# Patient Record
Sex: FEMALE | Race: WHITE | Employment: OTHER | URBAN - METROPOLITAN AREA
[De-identification: names, ages, dates, MRNs, and addresses within clinical notes are randomized per-mention and may not be internally consistent; named-entity substitution may affect disease eponyms.]

---

## 2017-01-10 RX ORDER — ESTRADIOL 0.1 MG/G
CREAM VAGINAL
Qty: 42.5 G | Refills: 1 | Status: SHIPPED | OUTPATIENT
Start: 2017-01-10 | End: 2018-07-01

## 2017-01-26 ENCOUNTER — HOSPITAL ENCOUNTER (OUTPATIENT)
Dept: SURGERY | Age: 62
Discharge: HOME | End: 2017-01-26
Admitting: INTERNAL MEDICINE

## 2017-01-26 VITALS
SYSTOLIC BLOOD PRESSURE: 107 MMHG | TEMPERATURE: 98.9 F | RESPIRATION RATE: 14 BRPM | DIASTOLIC BLOOD PRESSURE: 73 MMHG | WEIGHT: 137 LBS | OXYGEN SATURATION: 100 % | BODY MASS INDEX: 21.5 KG/M2 | HEART RATE: 65 BPM | HEIGHT: 67 IN

## 2017-01-26 RX ORDER — LIDOCAINE HYDROCHLORIDE 10 MG/ML
0.1 INJECTION, SOLUTION EPIDURAL; INFILTRATION; INTRACAUDAL; PERINEURAL ONCE
Status: DISCONTINUED | OUTPATIENT
Start: 2017-01-26 | End: 2017-01-27 | Stop reason: HOSPADM

## 2017-01-26 RX ORDER — SODIUM CHLORIDE, SODIUM LACTATE, POTASSIUM CHLORIDE, CALCIUM CHLORIDE 600; 310; 30; 20 MG/100ML; MG/100ML; MG/100ML; MG/100ML
INJECTION, SOLUTION INTRAVENOUS CONTINUOUS
Status: DISCONTINUED | OUTPATIENT
Start: 2017-01-26 | End: 2017-01-27 | Stop reason: HOSPADM

## 2017-01-26 ASSESSMENT — PAIN SCALES - GENERAL
PAINLEVEL_OUTOF10: 0

## 2017-01-26 ASSESSMENT — PAIN DESCRIPTION - LOCATION: LOCATION: ABDOMEN

## 2017-02-22 DIAGNOSIS — Z12.31 ENCOUNTER FOR SCREENING MAMMOGRAM FOR BREAST CANCER: Primary | ICD-10-CM

## 2017-02-22 DIAGNOSIS — M81.0 OSTEOPOROSIS: ICD-10-CM

## 2017-02-22 RX ORDER — PSEUDOEPHEDRINE HCL 60 MG/1
60 TABLET ORAL 2 TIMES DAILY
Qty: 180 TABLET | Refills: 3 | Status: ON HOLD | OUTPATIENT
Start: 2017-02-22 | End: 2018-07-03 | Stop reason: HOSPADM

## 2017-02-27 ENCOUNTER — HOSPITAL ENCOUNTER (OUTPATIENT)
Dept: OTHER | Age: 62
Discharge: HOME | End: 2017-02-27
Attending: FAMILY MEDICINE | Admitting: FAMILY MEDICINE

## 2017-02-27 DIAGNOSIS — R04.2 HEMOPTYSIS: Primary | ICD-10-CM

## 2017-02-27 DIAGNOSIS — R04.2 HEMOPTYSIS: ICD-10-CM

## 2017-03-08 ENCOUNTER — OFFICE VISIT (OUTPATIENT)
Dept: ENT CLINIC | Age: 62
End: 2017-03-08

## 2017-03-08 VITALS
HEIGHT: 67 IN | DIASTOLIC BLOOD PRESSURE: 70 MMHG | BODY MASS INDEX: 22.44 KG/M2 | SYSTOLIC BLOOD PRESSURE: 114 MMHG | WEIGHT: 143 LBS

## 2017-03-08 DIAGNOSIS — R04.2 HEMOPTYSIS: Primary | ICD-10-CM

## 2017-03-08 PROCEDURE — 31575 DIAGNOSTIC LARYNGOSCOPY: CPT | Performed by: OTOLARYNGOLOGY

## 2017-03-10 ENCOUNTER — TELEPHONE (OUTPATIENT)
Dept: FAMILY MEDICINE CLINIC | Age: 62
End: 2017-03-10

## 2017-03-10 ENCOUNTER — NURSE ONLY (OUTPATIENT)
Dept: FAMILY MEDICINE CLINIC | Age: 62
End: 2017-03-10

## 2017-03-10 DIAGNOSIS — M81.0 OSTEOPOROSIS: ICD-10-CM

## 2017-03-10 DIAGNOSIS — Z13.220 LIPID SCREENING: ICD-10-CM

## 2017-03-10 DIAGNOSIS — Z11.4 SCREENING FOR HIV (HUMAN IMMUNODEFICIENCY VIRUS): Primary | ICD-10-CM

## 2017-03-10 DIAGNOSIS — R04.2 HEMOPTYSIS: ICD-10-CM

## 2017-03-10 DIAGNOSIS — Z79.1 LONG TERM (CURRENT) USE OF NON-STEROIDAL ANTI-INFLAMMATORIES (NSAID): ICD-10-CM

## 2017-03-10 DIAGNOSIS — Z11.59 NEED FOR HEPATITIS C SCREENING TEST: ICD-10-CM

## 2017-03-10 LAB
ALBUMIN SERPL-MCNC: 4.6 G/DL (ref 3.4–5)
ANION GAP SERPL CALCULATED.3IONS-SCNC: 14 MMOL/L (ref 3–16)
BASOPHILS ABSOLUTE: 0 K/UL (ref 0–0.2)
BASOPHILS RELATIVE PERCENT: 0.8 %
BUN BLDV-MCNC: 16 MG/DL (ref 7–20)
CALCIUM SERPL-MCNC: 10.1 MG/DL (ref 8.3–10.6)
CHLORIDE BLD-SCNC: 99 MMOL/L (ref 99–110)
CHOLESTEROL, TOTAL: 224 MG/DL (ref 0–199)
CO2: 26 MMOL/L (ref 21–32)
CREAT SERPL-MCNC: 0.6 MG/DL (ref 0.6–1.2)
EOSINOPHILS ABSOLUTE: 0.1 K/UL (ref 0–0.6)
EOSINOPHILS RELATIVE PERCENT: 2.6 %
GFR AFRICAN AMERICAN: >60
GFR NON-AFRICAN AMERICAN: >60
GLUCOSE BLD-MCNC: 73 MG/DL (ref 70–99)
HCT VFR BLD CALC: 39.5 % (ref 36–48)
HDLC SERPL-MCNC: 75 MG/DL (ref 40–60)
HEMOGLOBIN: 12.8 G/DL (ref 12–16)
HEPATITIS C ANTIBODY INTERPRETATION: NORMAL
LDL CHOLESTEROL CALCULATED: 136 MG/DL
LYMPHOCYTES ABSOLUTE: 1.1 K/UL (ref 1–5.1)
LYMPHOCYTES RELATIVE PERCENT: 32.1 %
MCH RBC QN AUTO: 28.7 PG (ref 26–34)
MCHC RBC AUTO-ENTMCNC: 32.5 G/DL (ref 31–36)
MCV RBC AUTO: 88.3 FL (ref 80–100)
MONOCYTES ABSOLUTE: 0.3 K/UL (ref 0–1.3)
MONOCYTES RELATIVE PERCENT: 9.2 %
NEUTROPHILS ABSOLUTE: 1.9 K/UL (ref 1.7–7.7)
NEUTROPHILS RELATIVE PERCENT: 55.3 %
PDW BLD-RTO: 13.5 % (ref 12.4–15.4)
PHOSPHORUS: 4.1 MG/DL (ref 2.5–4.9)
PLATELET # BLD: 225 K/UL (ref 135–450)
PMV BLD AUTO: 9.8 FL (ref 5–10.5)
POTASSIUM SERPL-SCNC: 4.4 MMOL/L (ref 3.5–5.1)
RBC # BLD: 4.47 M/UL (ref 4–5.2)
SODIUM BLD-SCNC: 139 MMOL/L (ref 136–145)
TRIGL SERPL-MCNC: 65 MG/DL (ref 0–150)
VLDLC SERPL CALC-MCNC: 13 MG/DL
WBC # BLD: 3.5 K/UL (ref 4–11)

## 2017-03-10 PROCEDURE — 36415 COLL VENOUS BLD VENIPUNCTURE: CPT | Performed by: FAMILY MEDICINE

## 2017-03-11 LAB — VITAMIN D 25-HYDROXY: 63.4 NG/ML

## 2017-03-13 LAB — HIV-1 AND HIV-2 ANTIBODIES: NORMAL

## 2017-03-29 ENCOUNTER — TELEPHONE (OUTPATIENT)
Dept: FAMILY MEDICINE CLINIC | Age: 62
End: 2017-03-29

## 2017-03-31 ENCOUNTER — NURSE ONLY (OUTPATIENT)
Dept: FAMILY MEDICINE CLINIC | Age: 62
End: 2017-03-31

## 2017-03-31 VITALS
SYSTOLIC BLOOD PRESSURE: 118 MMHG | HEIGHT: 67 IN | BODY MASS INDEX: 22.54 KG/M2 | WEIGHT: 143.6 LBS | DIASTOLIC BLOOD PRESSURE: 66 MMHG

## 2017-03-31 DIAGNOSIS — Z00.00 WELL ADULT EXAM: Primary | ICD-10-CM

## 2017-04-27 ENCOUNTER — OFFICE VISIT (OUTPATIENT)
Dept: DERMATOLOGY | Age: 62
End: 2017-04-27

## 2017-04-27 DIAGNOSIS — Z12.83 SCREENING EXAM FOR SKIN CANCER: ICD-10-CM

## 2017-04-27 DIAGNOSIS — D22.9 MULTIPLE BENIGN NEVI: Primary | ICD-10-CM

## 2017-04-27 PROCEDURE — 99213 OFFICE O/P EST LOW 20 MIN: CPT | Performed by: DERMATOLOGY

## 2017-05-02 RX ORDER — AZITHROMYCIN 250 MG/1
TABLET, FILM COATED ORAL
Qty: 1 PACKET | Refills: 0 | Status: SHIPPED | OUTPATIENT
Start: 2017-05-02 | End: 2018-05-10 | Stop reason: ALTCHOICE

## 2017-05-04 ENCOUNTER — HOSPITAL ENCOUNTER (OUTPATIENT)
Dept: MAMMOGRAPHY | Age: 62
Discharge: HOME | End: 2017-05-04
Attending: FAMILY MEDICINE | Admitting: FAMILY MEDICINE

## 2017-05-04 DIAGNOSIS — M81.0 OSTEOPOROSIS: ICD-10-CM

## 2017-05-04 DIAGNOSIS — M81.0 AGE-RELATED OSTEOPOROSIS WITHOUT CURRENT PATHOLOGICAL FRACTURE: ICD-10-CM

## 2017-05-04 DIAGNOSIS — Z12.31 ENCOUNTER FOR SCREENING MAMMOGRAM FOR BREAST CANCER: ICD-10-CM

## 2017-06-06 ENCOUNTER — OFFICE VISIT (OUTPATIENT)
Dept: OBGYN CLINIC | Age: 62
End: 2017-06-06

## 2017-06-06 VITALS
TEMPERATURE: 98 F | HEIGHT: 66 IN | HEART RATE: 80 BPM | SYSTOLIC BLOOD PRESSURE: 104 MMHG | BODY MASS INDEX: 23.14 KG/M2 | DIASTOLIC BLOOD PRESSURE: 62 MMHG | WEIGHT: 144 LBS

## 2017-06-06 DIAGNOSIS — M85.80 OSTEOPENIA: ICD-10-CM

## 2017-06-06 DIAGNOSIS — Z53.8 PAP SMEAR OF CERVIX NOT NEEDED: ICD-10-CM

## 2017-06-06 DIAGNOSIS — Z90.710 HISTORY OF TOTAL VAGINAL HYSTERECTOMY (TVH): ICD-10-CM

## 2017-06-06 DIAGNOSIS — Z78.0 MENOPAUSE: ICD-10-CM

## 2017-06-06 DIAGNOSIS — Z01.419 WOMEN'S ANNUAL ROUTINE GYNECOLOGICAL EXAMINATION: Primary | ICD-10-CM

## 2017-06-06 PROCEDURE — 99396 PREV VISIT EST AGE 40-64: CPT | Performed by: OBSTETRICS & GYNECOLOGY

## 2017-06-06 ASSESSMENT — PATIENT HEALTH QUESTIONNAIRE - PHQ9
SUM OF ALL RESPONSES TO PHQ9 QUESTIONS 1 & 2: 0
2. FEELING DOWN, DEPRESSED OR HOPELESS: 0
SUM OF ALL RESPONSES TO PHQ QUESTIONS 1-9: 0
1. LITTLE INTEREST OR PLEASURE IN DOING THINGS: 0

## 2017-06-11 PROBLEM — Z78.0 MENOPAUSE: Status: ACTIVE | Noted: 2017-06-11

## 2017-06-11 PROBLEM — Z53.8 PAP SMEAR OF CERVIX NOT NEEDED: Status: ACTIVE | Noted: 2017-06-11

## 2017-06-11 ASSESSMENT — ENCOUNTER SYMPTOMS
CONSTIPATION: 0
ABDOMINAL DISTENTION: 0
ABDOMINAL PAIN: 0
NAUSEA: 0
DIARRHEA: 0
VOMITING: 0
SHORTNESS OF BREATH: 0

## 2017-08-04 ENCOUNTER — TELEPHONE (OUTPATIENT)
Dept: FAMILY MEDICINE CLINIC | Age: 62
End: 2017-08-04

## 2017-08-04 DIAGNOSIS — M54.2 NECK PAIN: Primary | ICD-10-CM

## 2017-11-28 RX ORDER — CELECOXIB 200 MG/1
CAPSULE ORAL
Qty: 180 CAPSULE | Refills: 3 | Status: ON HOLD | OUTPATIENT
Start: 2017-11-28 | End: 2018-07-03 | Stop reason: HOSPADM

## 2018-01-25 DIAGNOSIS — B96.89 ACUTE BACTERIAL SINUSITIS: Primary | ICD-10-CM

## 2018-01-25 DIAGNOSIS — J01.90 ACUTE BACTERIAL SINUSITIS: Primary | ICD-10-CM

## 2018-01-25 RX ORDER — AMOXICILLIN 875 MG/1
875 TABLET, COATED ORAL 2 TIMES DAILY
Qty: 20 TABLET | Refills: 0 | Status: SHIPPED | OUTPATIENT
Start: 2018-01-25 | End: 2018-02-04

## 2018-01-25 RX ORDER — AMOXICILLIN 875 MG/1
875 TABLET, COATED ORAL 2 TIMES DAILY
Qty: 20 TABLET | Refills: 0 | Status: SHIPPED | OUTPATIENT
Start: 2018-01-25 | End: 2018-01-25 | Stop reason: SDUPTHER

## 2018-05-01 DIAGNOSIS — M85.80 OSTEOPENIA, UNSPECIFIED LOCATION: Primary | ICD-10-CM

## 2018-05-01 DIAGNOSIS — Z12.39 BREAST CANCER SCREENING: ICD-10-CM

## 2018-05-10 ENCOUNTER — OFFICE VISIT (OUTPATIENT)
Dept: DERMATOLOGY | Age: 63
End: 2018-05-10

## 2018-05-10 DIAGNOSIS — Z12.83 SCREENING EXAM FOR SKIN CANCER: ICD-10-CM

## 2018-05-10 DIAGNOSIS — D22.9 MULTIPLE BENIGN NEVI: Primary | ICD-10-CM

## 2018-05-10 PROCEDURE — 99213 OFFICE O/P EST LOW 20 MIN: CPT | Performed by: DERMATOLOGY

## 2018-05-18 ENCOUNTER — NURSE ONLY (OUTPATIENT)
Dept: FAMILY MEDICINE CLINIC | Age: 63
End: 2018-05-18

## 2018-05-18 VITALS
DIASTOLIC BLOOD PRESSURE: 72 MMHG | HEIGHT: 67 IN | WEIGHT: 145.2 LBS | OXYGEN SATURATION: 97 % | SYSTOLIC BLOOD PRESSURE: 126 MMHG | HEART RATE: 56 BPM | BODY MASS INDEX: 22.79 KG/M2

## 2018-05-18 DIAGNOSIS — Z00.00 VISIT FOR ANNUAL HEALTH EXAMINATION: Primary | ICD-10-CM

## 2018-05-18 LAB
ANION GAP SERPL CALCULATED.3IONS-SCNC: 12 MMOL/L (ref 3–16)
BUN BLDV-MCNC: 15 MG/DL (ref 7–20)
CALCIUM SERPL-MCNC: 9.7 MG/DL (ref 8.3–10.6)
CHLORIDE BLD-SCNC: 101 MMOL/L (ref 99–110)
CHOLESTEROL, TOTAL: 232 MG/DL (ref 0–199)
CO2: 29 MMOL/L (ref 21–32)
CREAT SERPL-MCNC: 0.5 MG/DL (ref 0.6–1.2)
GFR AFRICAN AMERICAN: >60
GFR NON-AFRICAN AMERICAN: >60
GLUCOSE BLD-MCNC: 79 MG/DL (ref 70–99)
HDLC SERPL-MCNC: 86 MG/DL (ref 40–60)
LDL CHOLESTEROL CALCULATED: 133 MG/DL
POTASSIUM SERPL-SCNC: 4.7 MMOL/L (ref 3.5–5.1)
SODIUM BLD-SCNC: 142 MMOL/L (ref 136–145)
TRIGL SERPL-MCNC: 63 MG/DL (ref 0–150)
VITAMIN D 25-HYDROXY: 69.1 NG/ML
VLDLC SERPL CALC-MCNC: 13 MG/DL

## 2018-05-18 PROCEDURE — 36415 COLL VENOUS BLD VENIPUNCTURE: CPT | Performed by: FAMILY MEDICINE

## 2018-05-31 ENCOUNTER — HOSPITAL ENCOUNTER (OUTPATIENT)
Dept: MAMMOGRAPHY | Age: 63
Discharge: HOME | End: 2018-05-31
Attending: FAMILY MEDICINE | Admitting: FAMILY MEDICINE

## 2018-05-31 DIAGNOSIS — Z12.39 BREAST CANCER SCREENING: ICD-10-CM

## 2018-06-27 ENCOUNTER — NURSE ONLY (OUTPATIENT)
Dept: FAMILY MEDICINE CLINIC | Age: 63
End: 2018-06-27

## 2018-06-27 DIAGNOSIS — R00.0 TACHYCARDIA: Primary | ICD-10-CM

## 2018-06-27 DIAGNOSIS — R06.02 SOB (SHORTNESS OF BREATH) ON EXERTION: Primary | ICD-10-CM

## 2018-06-27 DIAGNOSIS — R06.09 DOE (DYSPNEA ON EXERTION): ICD-10-CM

## 2018-06-27 PROCEDURE — 36415 COLL VENOUS BLD VENIPUNCTURE: CPT | Performed by: FAMILY MEDICINE

## 2018-06-27 PROCEDURE — 93000 ELECTROCARDIOGRAM COMPLETE: CPT | Performed by: FAMILY MEDICINE

## 2018-06-28 ENCOUNTER — HOSPITAL ENCOUNTER (OUTPATIENT)
Dept: OTHER | Age: 63
Discharge: HOME | End: 2018-06-28
Attending: FAMILY MEDICINE | Admitting: FAMILY MEDICINE

## 2018-06-28 DIAGNOSIS — R06.09 DOE (DYSPNEA ON EXERTION): ICD-10-CM

## 2018-06-28 DIAGNOSIS — R00.0 TACHYCARDIA: Primary | ICD-10-CM

## 2018-06-28 DIAGNOSIS — R00.0 TACHYCARDIA: ICD-10-CM

## 2018-06-28 LAB
ANION GAP SERPL CALCULATED.3IONS-SCNC: 15 MMOL/L (ref 3–16)
BASOPHILS ABSOLUTE: 0 K/UL (ref 0–0.2)
BASOPHILS RELATIVE PERCENT: 0.6 %
BUN BLDV-MCNC: 14 MG/DL (ref 7–20)
CALCIUM SERPL-MCNC: 10 MG/DL (ref 8.3–10.6)
CHLORIDE BLD-SCNC: 100 MMOL/L (ref 99–110)
CO2: 27 MMOL/L (ref 21–32)
CREAT SERPL-MCNC: 0.7 MG/DL (ref 0.6–1.2)
EOSINOPHILS ABSOLUTE: 0.1 K/UL (ref 0–0.6)
EOSINOPHILS RELATIVE PERCENT: 0.9 %
GFR AFRICAN AMERICAN: >60
GFR NON-AFRICAN AMERICAN: >60
GLUCOSE BLD-MCNC: 56 MG/DL (ref 70–99)
HCT VFR BLD CALC: 40.8 % (ref 36–48)
HEMOGLOBIN: 13.7 G/DL (ref 12–16)
LYMPHOCYTES ABSOLUTE: 1.1 K/UL (ref 1–5.1)
LYMPHOCYTES RELATIVE PERCENT: 19.6 %
MAGNESIUM: 2.1 MG/DL (ref 1.8–2.4)
MCH RBC QN AUTO: 29.1 PG (ref 26–34)
MCHC RBC AUTO-ENTMCNC: 33.7 G/DL (ref 31–36)
MCV RBC AUTO: 86.5 FL (ref 80–100)
MONOCYTES ABSOLUTE: 0.5 K/UL (ref 0–1.3)
MONOCYTES RELATIVE PERCENT: 8.9 %
NEUTROPHILS ABSOLUTE: 4 K/UL (ref 1.7–7.7)
NEUTROPHILS RELATIVE PERCENT: 70 %
PDW BLD-RTO: 13 % (ref 12.4–15.4)
PLATELET # BLD: 194 K/UL (ref 135–450)
PMV BLD AUTO: 10 FL (ref 5–10.5)
POTASSIUM SERPL-SCNC: 4.7 MMOL/L (ref 3.5–5.1)
RBC # BLD: 4.72 M/UL (ref 4–5.2)
SODIUM BLD-SCNC: 142 MMOL/L (ref 136–145)
T4 FREE: 1.2 NG/DL (ref 0.9–1.8)
TSH SERPL DL<=0.05 MIU/L-ACNC: 2.71 UIU/ML (ref 0.27–4.2)
WBC # BLD: 5.7 K/UL (ref 4–11)

## 2018-07-01 PROBLEM — R06.02 SOB (SHORTNESS OF BREATH): Status: ACTIVE | Noted: 2018-07-01

## 2018-07-01 PROBLEM — R79.89 ELEVATED TROPONIN: Status: ACTIVE | Noted: 2018-07-01

## 2018-07-01 PROBLEM — R07.9 CHEST PAIN: Status: ACTIVE | Noted: 2018-07-01

## 2018-07-01 PROBLEM — Z53.8 PAP SMEAR OF CERVIX NOT NEEDED: Status: RESOLVED | Noted: 2017-06-11 | Resolved: 2018-07-01

## 2018-07-01 PROBLEM — I26.99 BILATERAL PULMONARY EMBOLISM (HCC): Status: ACTIVE | Noted: 2018-07-01

## 2018-07-01 PROBLEM — R77.8 ELEVATED TROPONIN: Status: ACTIVE | Noted: 2018-07-01

## 2018-07-09 ENCOUNTER — OFFICE VISIT (OUTPATIENT)
Dept: FAMILY MEDICINE CLINIC | Age: 63
End: 2018-07-09

## 2018-07-09 VITALS
DIASTOLIC BLOOD PRESSURE: 82 MMHG | OXYGEN SATURATION: 92 % | WEIGHT: 144 LBS | HEART RATE: 88 BPM | SYSTOLIC BLOOD PRESSURE: 129 MMHG | HEIGHT: 66 IN | BODY MASS INDEX: 23.14 KG/M2

## 2018-07-09 DIAGNOSIS — I26.99 BILATERAL PULMONARY EMBOLISM (HCC): Primary | ICD-10-CM

## 2018-07-09 DIAGNOSIS — I51.7 VENTRICULAR ENLARGEMENT, RIGHT: ICD-10-CM

## 2018-07-09 DIAGNOSIS — I82.493 ACUTE DEEP VEIN THROMBOSIS (DVT) OF OTHER SPECIFIED VEIN OF BOTH LOWER EXTREMITIES (HCC): ICD-10-CM

## 2018-07-09 DIAGNOSIS — R06.02 SOB (SHORTNESS OF BREATH) ON EXERTION: ICD-10-CM

## 2018-07-09 PROBLEM — I82.409 DEEP VENOUS THROMBOSIS (HCC): Status: ACTIVE | Noted: 2018-07-09

## 2018-07-09 PROCEDURE — 99214 OFFICE O/P EST MOD 30 MIN: CPT | Performed by: FAMILY MEDICINE

## 2018-07-09 PROCEDURE — 1111F DSCHRG MED/CURRENT MED MERGE: CPT | Performed by: FAMILY MEDICINE

## 2018-07-09 NOTE — PROGRESS NOTES
status: Never Smoker    Smokeless tobacco: Never Used    Alcohol use Yes      Comment: rarely    Drug use: No    Sexual activity: Yes     Partners: Male     Other Topics Concern    Not on file     Social History Narrative    No narrative on file       Prior to Visit Medications    Medication Sig Taking? Authorizing Provider   apixaban (ELIQUIS) 5 MG TABS tablet Take 1 tablet twice a day Yes Sharon Weiss MD   Biotin 5000 MCG CAPS Take 5,000 mcg by mouth Yes Historical Provider, MD   Cholecalciferol (VITAMIN D) 2000 UNITS CAPS capsule Take 2,000 Units by mouth Yes Historical Provider, MD   Calcium Carbonate-Vitamin D (CALCIUM-VITAMIN D) 500-200 MG-UNIT per tablet Take 1 tablet by mouth 2 times daily (with meals). Yes Historical Provider, MD   Glucosamine-Chondroit-Vit C-Mn (GLUCOSAMINE 1500 COMPLEX) CAPS Take 2 tablets by mouth daily. Yes Historical Provider, MD   therapeutic multivitamin-minerals (THERAGRAN-M) tablet Take 1 tablet by mouth daily. Yes Historical Provider, MD     Allergies   Allergen Reactions    Erythromycin Nausea Only       OBJECTIVE:  Estimated body mass index is 23.24 kg/m² as calculated from the following:    Height as of this encounter: 5' 6\" (1.676 m). Weight as of this encounter: 144 lb (65.3 kg). Vitals:    07/09/18 1809   BP: 129/82   Site: Left Arm   Position: Sitting   Cuff Size: Medium Adult   Pulse: 88   SpO2: 92%   Weight: 144 lb (65.3 kg)   Height: 5' 6\" (1.676 m)     BP Readings from Last 2 Encounters:   07/09/18 129/82   07/03/18 119/73     Wt Readings from Last 3 Encounters:   07/09/18 144 lb (65.3 kg)   07/01/18 140 lb 8 oz (63.7 kg)   05/18/18 145 lb 3.2 oz (65.9 kg)       Physical Exam   Constitutional: She appears well-developed and well-nourished. No distress. HENT:   Head: Normocephalic and atraumatic.    Right Ear: External ear normal.   Left Ear: External ear normal.   Nose: Nose normal.   Lips symmetric   Eyes: Lids are normal. Pupils are equal,

## 2018-07-11 ENCOUNTER — TELEPHONE (OUTPATIENT)
Dept: FAMILY MEDICINE CLINIC | Age: 63
End: 2018-07-11

## 2018-07-31 PROBLEM — R77.8 ELEVATED TROPONIN: Status: RESOLVED | Noted: 2018-07-01 | Resolved: 2018-07-31

## 2018-07-31 PROBLEM — R79.89 ELEVATED TROPONIN: Status: RESOLVED | Noted: 2018-07-01 | Resolved: 2018-07-31

## 2018-08-14 ENCOUNTER — NURSE ONLY (OUTPATIENT)
Dept: FAMILY MEDICINE CLINIC | Age: 63
End: 2018-08-14

## 2018-08-14 DIAGNOSIS — Z23 NEED FOR SHINGLES VACCINE: Primary | ICD-10-CM

## 2018-08-14 PROCEDURE — 90471 IMMUNIZATION ADMIN: CPT | Performed by: FAMILY MEDICINE

## 2018-08-14 PROCEDURE — 90750 HZV VACC RECOMBINANT IM: CPT | Performed by: FAMILY MEDICINE

## 2018-11-28 NOTE — PROGRESS NOTES
twice a day 180 tablet 3    Biotin 5000 MCG CAPS Take 5,000 mcg by mouth      Cholecalciferol (VITAMIN D) 2000 UNITS CAPS capsule Take 2,000 Units by mouth      Calcium Carbonate-Vitamin D (CALCIUM-VITAMIN D) 500-200 MG-UNIT per tablet Take 1 tablet by mouth 2 times daily (with meals).  Glucosamine-Chondroit-Vit C-Mn (GLUCOSAMINE 1500 COMPLEX) CAPS Take 2 tablets by mouth daily.  therapeutic multivitamin-minerals (THERAGRAN-M) tablet Take 1 tablet by mouth daily. No current facility-administered medications for this visit. Allergies:  Erythromycin     Review of Systems:   A 14 point review of symptoms completed. Pertinent positives identified in the HPI, all other review of symptoms negative as below.     Objective:   PHYSICAL EXAM:    Vitals:    11/29/18 1428   BP: 110/70   Pulse: 65   SpO2: 98%    Weight: 147 lb 9.6 oz (67 kg)     Wt Readings from Last 3 Encounters:   11/29/18 147 lb 9.6 oz (67 kg)   07/09/18 144 lb (65.3 kg)   07/01/18 140 lb 8 oz (63.7 kg)         General Appearance:  Alert, cooperative, no distress, appears stated age   Head:  Normocephalic, atraumatic   Eyes:  PERRL, conjunctiva/corneas clear   Nose: Nares normal, no drainage or sinus tenderness   Throat: Lips, mucosa, and tongue normal   Neck: Supple, symmetrical, trachea midline, NL thyroid no carotid bruit or JVD   Lungs:   CTAB, respirations unlabored   Chest Wall:  No tenderness or deformity   Heart:  Regular rhythm and normal rate; S1, S2 are normal;   no murmur noted; no rub or gallop   Abdomen:   Soft, non-tender, +BS x 4, no masses, no organomegaly   Extremities: Extremities normal, atraumatic, no cyanosis or edema   Pulses: 2+ and symmetric   Skin: Skin color, texture, turgor normal, no rashes or lesions   Pysch: Normal mood and affect   Neurologic: Normal gross motor and sensory exam.         LABS   CBC:      Lab Results   Component Value Date    WBC 4.0 07/03/2018    RBC 4.64 07/03/2018    HGB 13.4 07/03/2018    HCT 40.1 07/03/2018    MCV 86.5 07/03/2018    RDW 12.8 07/03/2018     07/03/2018     CMP:  Lab Results   Component Value Date     07/03/2018    K 4.6 07/03/2018     07/03/2018    CO2 30 07/03/2018    BUN 11 07/03/2018    CREATININE <0.5 07/03/2018    GFRAA >60 07/03/2018    GFRAA >60 10/04/2012    AGRATIO 1.3 07/01/2018    LABGLOM >60 07/03/2018    GLUCOSE 91 07/03/2018    PROT 7.7 07/01/2018    PROT 7.3 10/04/2012    CALCIUM 9.5 07/03/2018    BILITOT 0.3 07/01/2018    ALKPHOS 75 07/01/2018    AST 20 07/01/2018    ALT 14 07/01/2018     PT/INR:   No results found for: PTINR  Liver:  No components found for: CHLPL  Lab Results   Component Value Date    ALT 14 07/01/2018    AST 20 07/01/2018    ALKPHOS 75 07/01/2018    BILITOT 0.3 07/01/2018     Lab Results   Component Value Date    LABA1C 5.2 07/02/2018     Lipids:         Lab Results   Component Value Date    TRIG 93 07/02/2018    TRIG 63 05/18/2018    TRIG 65 03/10/2017            Lab Results   Component Value Date    HDL 62 (H) 07/02/2018    HDL 86 (H) 05/18/2018    HDL 75 (H) 03/10/2017            Lab Results   Component Value Date    LDLCALC 111 (H) 07/02/2018    LDLCALC 133 (H) 05/18/2018    LDLCALC 136 (H) 03/10/2017            Lab Results   Component Value Date    LABVLDL 19 07/02/2018    LABVLDL 13 05/18/2018    LABVLDL 13 03/10/2017         CARDIAC DATA     ECHO 11/29   Normal left ventricle systolic function with an estimated ejection fraction of 55%.   No regional wall motion abnormalities are seen.   Normal left ventricular diastolic filling pressure.   Mild pulmonic regurgitation.   Mild mitral and tricuspid regurgitation.   Systolic pulmonary artery pressure (SPAP) is normal and estimated at 27 mmHg   (right atrial pressure 3 mmHg).   Compared to last echo on 7/2/2018, the right atrium and ventricle appear   smaller in size and SPAP has decreased with no change in EF .ECHO 11/29      ECHO/MUGA: 7/2/18  Summary   Left ventricular systolic function is normal with ejection fraction   estimated at 55%. Grade I diastolic dysfunction with normal left ventricular   filling pressure. Normal left ventricular size and wall thickness. -- Mild to moderate mitral regurgitation. No stenosis. -- The right ventricle is mildly enlarged by visual assessment. Right   ventricular systolic function is normal. There is mild diastolic septal   flattening consistent with right ventricular volume overload. There is mild   diastolic septal flattening consistent with right ventricular volume overload. -- Systolic pulmonary artery pressure (SPAP) is estimated at 47 mmHg   consistent with mild pulmonary hypertension (Right atrial pressure of 3   mmHg). STRESS TEST:    CARDIAC CATH:    VASCULAR/OTHER IMAGIN/2/18 Venous Doppler  Summary    1. There is evidence of acute deep venous thrombosis involving the soleal  and peroneal vein at mid calf in the right lower extremity. There is no other evidence of deep or superficial venous thrombosis involving the right  lower extremity   2. There is no evidence of deep or superficial venous thrombosis involving  the left lower extremity. Assessment and Plan   Juliocesar Sandoval is a 61 y.o. female who presents today for the following problems:      1. Pulmonary embolism: Bilateral, multiple segments  2. SOB- resolved  3. RV dilation- resolved     MD PLAN  1. Pt doing very well. Note below the knee DVT  2. Echo with full RV recovery   Can f/u prn        MD Plan:     Patient Active Problem List   Diagnosis    Osteopenia    History of total vaginal hysterectomy (TVH)    Menopause    Chest pain    SOB (shortness of breath) on exertion    Multiple bilateral pulmonary emboli with R heart strain    Deep venous thrombosis (HCC)    Ventricular enlargement, right       Patient Plan:  1. Continue current medications  2.  Follow up with PCP and Dr. Rob Bray      It is a pleasure to assist in the care of

## 2018-11-29 ENCOUNTER — OFFICE VISIT (OUTPATIENT)
Dept: CARDIOLOGY CLINIC | Age: 63
End: 2018-11-29
Payer: COMMERCIAL

## 2018-11-29 ENCOUNTER — HOSPITAL ENCOUNTER (OUTPATIENT)
Dept: CARDIOLOGY | Age: 63
Discharge: HOME | End: 2018-11-29
Payer: COMMERCIAL

## 2018-11-29 VITALS
BODY MASS INDEX: 23.72 KG/M2 | DIASTOLIC BLOOD PRESSURE: 70 MMHG | HEART RATE: 65 BPM | SYSTOLIC BLOOD PRESSURE: 110 MMHG | HEIGHT: 66 IN | OXYGEN SATURATION: 98 % | WEIGHT: 147.6 LBS

## 2018-11-29 DIAGNOSIS — I82.493 ACUTE DEEP VEIN THROMBOSIS (DVT) OF OTHER SPECIFIED VEIN OF BOTH LOWER EXTREMITIES (HCC): ICD-10-CM

## 2018-11-29 DIAGNOSIS — R06.02 SOB (SHORTNESS OF BREATH) ON EXERTION: ICD-10-CM

## 2018-11-29 DIAGNOSIS — I27.82 OTHER CHRONIC PULMONARY EMBOLISM WITHOUT ACUTE COR PULMONALE (HCC): Primary | ICD-10-CM

## 2018-11-29 LAB
LV EF: 55 %
LVEF MODALITY: NORMAL

## 2018-11-29 PROCEDURE — 99213 OFFICE O/P EST LOW 20 MIN: CPT | Performed by: INTERNAL MEDICINE

## 2018-11-29 PROCEDURE — 93306 TTE W/DOPPLER COMPLETE: CPT

## 2018-11-29 NOTE — LETTER
[address] Cardiology - [address] [place] [address]  Phone: [phone]  Fax: [phone]    Meka Perla MD        2018     Celia Tolentino, [address] [facility]. [place] [id number]    Patient: Zach Mendoza  MR Number: [id number]  YOB: 1955  Date of Visit: 2018    Dear Dr. Celia Tolentino:     Below are the relevant portions of my assessment and plan of care. [address]   Cardiovascular Evaluation    PATIENT: Lloyd Ruff: 2018  MRN: [id number]  CSN: [id number]  : 1955      Primary Care Doctor: Celia Tolentino MD  Reason for evaluation:   6 Month Follow-Up (echo results no cardiac complaints)      Subjective:   History of present illness on initial date of evaluation:   Zach Mendoza is a 61 y.o. patient who presents today for hospital follow up. Patient presented to the hospital with complaints of SOB. She reported that not able to do her 2 mile walk and HR noted about 160. Noted having palpitations, saw PCP and ordered echo and holter. No orthopnea. Few times felt like bra was too tight. No tobacco hx. + previous second hand exposure. Today she reports feeling well. Patient denies chest pain, sob, palpitations, dizziness or syncope. Patient Active Problem List   Diagnosis    Osteopenia    History of total vaginal hysterectomy (TVH)    Menopause    Chest pain    SOB (shortness of breath) on exertion    Multiple bilateral pulmonary emboli with R heart strain    Deep venous thrombosis (HCC)    Ventricular enlargement, right         Past Medical History:   has a past medical history of Allergic rhinitis; Osteoarthritis; and Osteoporosis. Surgical History:   has a past surgical history that includes Hysterectomy; Bunionectomy; Tubal ligation; and Colonoscopy (2017).      Social History: reports that she has never smoked. She has never used smokeless tobacco. She reports that she drinks alcohol. She reports that she does not use drugs. Family History:  No evidence for sudden cardiac death or premature CAD    Home Medications:  Reviewed and are listed in nursing record. and/or listed below  Current Outpatient Prescriptions   Medication Sig Dispense Refill    DiphenhydrAMINE HCl (BENADRYL ALLERGY PO) Take 25 mg by mouth nightly      apixaban (ELIQUIS) 5 MG TABS tablet Take 1 tablet twice a day 180 tablet 3    Biotin 5000 MCG CAPS Take 5,000 mcg by mouth      Cholecalciferol (VITAMIN D) 2000 UNITS CAPS capsule Take 2,000 Units by mouth      Calcium Carbonate-Vitamin D (CALCIUM-VITAMIN D) 500-200 MG-UNIT per tablet Take 1 tablet by mouth 2 times daily (with meals).  Glucosamine-Chondroit-Vit C-Mn (GLUCOSAMINE 1500 COMPLEX) CAPS Take 2 tablets by mouth daily.  therapeutic multivitamin-minerals (THERAGRAN-M) tablet Take 1 tablet by mouth daily. No current facility-administered medications for this visit. Allergies:  Erythromycin     Review of Systems:   A 14 point review of symptoms completed. Pertinent positives identified in the HPI, all other review of symptoms negative as below.     Objective:   PHYSICAL EXAM:    Vitals:    11/29/18 1428   BP: 110/70   Pulse: 65   SpO2: 98%    Weight: 147 lb 9.6 oz (67 kg)     Wt Readings from Last 3 Encounters:   11/29/18 147 lb 9.6 oz (67 kg)   07/09/18 144 lb (65.3 kg)   07/01/18 140 lb 8 oz (63.7 kg)         General Appearance:  Alert, cooperative, no distress, appears stated age   Head:  Normocephalic, atraumatic   Eyes:  PERRL, conjunctiva/corneas clear   Nose: Nares normal, no drainage or sinus tenderness   Throat: Lips, mucosa, and tongue normal   Neck: Supple, symmetrical, trachea midline, NL thyroid no carotid bruit or JVD   Lungs:   CTAB, respirations unlabored   Chest Wall:  No tenderness or deformity    MD PLAN  1. Pt doing very well. Note below the knee DVT  2. Echo with full RV recovery   Can f/u prn        MD Plan:     Patient Active Problem List   Diagnosis    Osteopenia    History of total vaginal hysterectomy (TVH)    Menopause    Chest pain    SOB (shortness of breath) on exertion    Multiple bilateral pulmonary emboli with R heart strain    Deep venous thrombosis (HCC)    Ventricular enlargement, right       Patient Plan:  1. Continue current medications  2. Follow up with PCP and Dr. Ana Maria Rangel      It is a pleasure to assist in the care of Luis Shelton. Please call with any questions. The scribes docuementation has been prepared under my direction and personally reviewed by me in its entirety. I confirm that the note above accurately reflects all work, treatment, procedures, and medical decision making performed by me. This note was scribed in the presence of Dr. Kinza Talley MD by Ava Boyer RN. Júnior Hirsch MD, [address] [facility] Cardiologist  [facility] [address]  [phone] [facility]  [phone] [address] [place] [address]  11/29/2018  2:47 PM             If you have questions, please do not hesitate to call me. I look forward to following Stacie Rosado along with you.     Sincerely,        Malachi Wright MD

## 2018-11-30 ENCOUNTER — NURSE ONLY (OUTPATIENT)
Dept: FAMILY MEDICINE CLINIC | Age: 63
End: 2018-11-30
Payer: COMMERCIAL

## 2018-11-30 DIAGNOSIS — Z23 NEED FOR SHINGLES VACCINE: Primary | ICD-10-CM

## 2018-11-30 PROCEDURE — 90471 IMMUNIZATION ADMIN: CPT | Performed by: FAMILY MEDICINE

## 2018-11-30 PROCEDURE — 90750 HZV VACC RECOMBINANT IM: CPT | Performed by: FAMILY MEDICINE

## 2018-11-30 NOTE — COMMUNICATION BODY
[address] [facility]   Cardiovascular Evaluation    PATIENT: Maria T Ortega: 2018  MRN: [id number]  CSN: [id number]  : 1955      Primary Care Doctor: Rogers Cooper MD  Reason for evaluation:   6 Month Follow-Up (echo results no cardiac complaints)      Subjective:   History of present illness on initial date of evaluation:   Kasie Gonzalez is a 61 y.o. patient who presents today for hospital follow up. Patient presented to the hospital with complaints of SOB. She reported that not able to do her 2 mile walk and HR noted about 160. Noted having palpitations, saw PCP and ordered echo and holter. No orthopnea. Few times felt like bra was too tight. No tobacco hx. + previous second hand exposure. Today she reports feeling well. Patient denies chest pain, sob, palpitations, dizziness or syncope. Patient Active Problem List   Diagnosis    Osteopenia    History of total vaginal hysterectomy (TVH)    Menopause    Chest pain    SOB (shortness of breath) on exertion    Multiple bilateral pulmonary emboli with R heart strain    Deep venous thrombosis (HCC)    Ventricular enlargement, right         Past Medical History:   has a past medical history of Allergic rhinitis; Osteoarthritis; and Osteoporosis. Surgical History:   has a past surgical history that includes Hysterectomy; Bunionectomy; Tubal ligation; and Colonoscopy (2017). Social History:   reports that she has never smoked. She has never used smokeless tobacco. She reports that she drinks alcohol. She reports that she does not use drugs. Family History:  No evidence for sudden cardiac death or premature CAD    Home Medications:  Reviewed and are listed in nursing record.  and/or listed below  Current Outpatient Prescriptions   Medication Sig Dispense Refill    DiphenhydrAMINE HCl (BENADRYL ALLERGY PO) Take 25 mg by mouth nightly      apixaban (ELIQUIS) 5 MG TABS tablet Take 1 tablet

## 2018-12-26 ENCOUNTER — TELEPHONE (OUTPATIENT)
Dept: FAMILY MEDICINE CLINIC | Age: 63
End: 2018-12-26

## 2018-12-26 RX ORDER — FAMCICLOVIR 500 MG/1
TABLET, FILM COATED ORAL
Qty: 20 TABLET | Refills: 0 | Status: SHIPPED | OUTPATIENT
Start: 2018-12-26

## 2018-12-26 NOTE — LETTER
[facility]  [address] [facility]  Phone: [phone]  Fax: [phone]    Kenyatta Cobian MD        December 26, 2018    [address] [place] [address]      To Whom It May Concern,     In my opinion, Brennan Shelby will benefit for medical massage for bilateral shoulder pain. If you have any questions or concerns, please don't hesitate to call.     Sincerely,          Kenyatta Cobian MD

## 2019-01-02 ENCOUNTER — TELEPHONE (OUTPATIENT)
Dept: FAMILY MEDICINE CLINIC | Age: 64
End: 2019-01-02

## 2019-02-26 ENCOUNTER — NURSE ONLY (OUTPATIENT)
Dept: FAMILY MEDICINE CLINIC | Age: 64
End: 2019-02-26
Payer: COMMERCIAL

## 2019-02-26 DIAGNOSIS — Z23 NEED FOR TD VACCINE: Primary | ICD-10-CM

## 2019-02-26 PROCEDURE — 90471 IMMUNIZATION ADMIN: CPT | Performed by: FAMILY MEDICINE

## 2019-02-26 PROCEDURE — 90714 TD VACC NO PRESV 7 YRS+ IM: CPT | Performed by: FAMILY MEDICINE

## 2019-03-13 DIAGNOSIS — Z12.31 SCREENING MAMMOGRAM, ENCOUNTER FOR: Primary | ICD-10-CM

## 2019-03-13 DIAGNOSIS — M85.80 OSTEOPENIA, UNSPECIFIED LOCATION: ICD-10-CM

## 2019-03-15 ENCOUNTER — OFFICE VISIT (OUTPATIENT)
Dept: OBGYN CLINIC | Age: 64
End: 2019-03-15
Payer: COMMERCIAL

## 2019-03-15 VITALS
DIASTOLIC BLOOD PRESSURE: 72 MMHG | BODY MASS INDEX: 22 KG/M2 | TEMPERATURE: 99.1 F | HEART RATE: 59 BPM | SYSTOLIC BLOOD PRESSURE: 116 MMHG | WEIGHT: 140.2 LBS | HEIGHT: 67 IN

## 2019-03-15 DIAGNOSIS — Z01.419 WOMEN'S ANNUAL ROUTINE GYNECOLOGICAL EXAMINATION: Primary | ICD-10-CM

## 2019-03-15 DIAGNOSIS — Z78.0 MENOPAUSE: ICD-10-CM

## 2019-03-15 DIAGNOSIS — M85.80 OSTEOPENIA, UNSPECIFIED LOCATION: ICD-10-CM

## 2019-03-15 DIAGNOSIS — Z86.711 HISTORY OF PULMONARY EMBOLISM: ICD-10-CM

## 2019-03-15 DIAGNOSIS — Z90.710 HISTORY OF TOTAL VAGINAL HYSTERECTOMY (TVH): ICD-10-CM

## 2019-03-15 PROCEDURE — 99396 PREV VISIT EST AGE 40-64: CPT | Performed by: OBSTETRICS & GYNECOLOGY

## 2019-03-17 PROBLEM — Z86.711 HISTORY OF PULMONARY EMBOLISM: Status: ACTIVE | Noted: 2019-03-17

## 2019-03-17 ASSESSMENT — ENCOUNTER SYMPTOMS
SHORTNESS OF BREATH: 0
NAUSEA: 0
ABDOMINAL DISTENTION: 0
CONSTIPATION: 0
DIARRHEA: 0
ABDOMINAL PAIN: 0
VOMITING: 0

## 2019-03-20 RX ORDER — CELECOXIB 200 MG/1
CAPSULE ORAL
Qty: 90 CAPSULE | Refills: 3 | Status: SHIPPED | OUTPATIENT
Start: 2019-03-20 | End: 2020-01-06 | Stop reason: SDUPTHER

## 2019-06-13 ENCOUNTER — HOSPITAL ENCOUNTER (OUTPATIENT)
Dept: MAMMOGRAPHY | Age: 64
Discharge: HOME | End: 2019-06-13
Payer: COMMERCIAL

## 2019-06-13 ENCOUNTER — HOSPITAL ENCOUNTER (OUTPATIENT)
Dept: GENERAL RADIOLOGY | Age: 64
Discharge: HOME | End: 2019-06-13
Payer: COMMERCIAL

## 2019-06-13 DIAGNOSIS — M85.80 OSTEOPENIA, UNSPECIFIED LOCATION: ICD-10-CM

## 2019-06-13 DIAGNOSIS — Z12.31 SCREENING MAMMOGRAM, ENCOUNTER FOR: ICD-10-CM

## 2019-06-13 PROCEDURE — 77080 DXA BONE DENSITY AXIAL: CPT

## 2019-06-13 PROCEDURE — 77063 BREAST TOMOSYNTHESIS BI: CPT

## 2019-06-14 DIAGNOSIS — M85.80 OSTEOPENIA, UNSPECIFIED LOCATION: ICD-10-CM

## 2019-06-14 DIAGNOSIS — Z12.39 SCREENING FOR BREAST CANCER: Primary | ICD-10-CM

## 2019-06-28 ENCOUNTER — NURSE ONLY (OUTPATIENT)
Dept: FAMILY MEDICINE CLINIC | Age: 64
End: 2019-06-28
Payer: COMMERCIAL

## 2019-06-28 DIAGNOSIS — Z00.00 ROUTINE GENERAL MEDICAL EXAMINATION AT A HEALTH CARE FACILITY: Primary | ICD-10-CM

## 2019-06-28 LAB
ANION GAP SERPL CALCULATED.3IONS-SCNC: 12 MMOL/L (ref 3–16)
BUN BLDV-MCNC: 15 MG/DL (ref 7–20)
CALCIUM SERPL-MCNC: 10.1 MG/DL (ref 8.3–10.6)
CHLORIDE BLD-SCNC: 101 MMOL/L (ref 99–110)
CHOLESTEROL, TOTAL: 214 MG/DL (ref 0–199)
CO2: 27 MMOL/L (ref 21–32)
CREAT SERPL-MCNC: 0.6 MG/DL (ref 0.6–1.2)
GFR AFRICAN AMERICAN: >60
GFR NON-AFRICAN AMERICAN: >60
GLUCOSE BLD-MCNC: 80 MG/DL (ref 70–99)
HDLC SERPL-MCNC: 85 MG/DL (ref 40–60)
LDL CHOLESTEROL CALCULATED: 118 MG/DL
POTASSIUM SERPL-SCNC: 4.5 MMOL/L (ref 3.5–5.1)
SODIUM BLD-SCNC: 140 MMOL/L (ref 136–145)
TRIGL SERPL-MCNC: 53 MG/DL (ref 0–150)
VITAMIN D 25-HYDROXY: 57.9 NG/ML
VLDLC SERPL CALC-MCNC: 11 MG/DL

## 2019-06-28 PROCEDURE — 36415 COLL VENOUS BLD VENIPUNCTURE: CPT | Performed by: FAMILY MEDICINE

## 2019-10-17 ENCOUNTER — OFFICE VISIT (OUTPATIENT)
Dept: DERMATOLOGY | Age: 64
End: 2019-10-17
Payer: COMMERCIAL

## 2019-10-17 DIAGNOSIS — Z12.83 SCREENING EXAM FOR SKIN CANCER: ICD-10-CM

## 2019-10-17 DIAGNOSIS — D48.5 NEOPLASM OF UNCERTAIN BEHAVIOR OF SKIN: ICD-10-CM

## 2019-10-17 DIAGNOSIS — D22.9 MULTIPLE BENIGN NEVI: Primary | ICD-10-CM

## 2019-10-17 PROCEDURE — 11102 TANGNTL BX SKIN SINGLE LES: CPT | Performed by: DERMATOLOGY

## 2019-10-17 PROCEDURE — 11103 TANGNTL BX SKIN EA SEP/ADDL: CPT | Performed by: DERMATOLOGY

## 2019-10-17 PROCEDURE — 99213 OFFICE O/P EST LOW 20 MIN: CPT | Performed by: DERMATOLOGY

## 2019-10-24 LAB — DERMATOLOGY PATHOLOGY REPORT: NORMAL

## 2019-10-25 ENCOUNTER — TELEPHONE (OUTPATIENT)
Dept: DERMATOLOGY | Age: 64
End: 2019-10-25

## 2019-11-11 ENCOUNTER — NURSE ONLY (OUTPATIENT)
Dept: FAMILY MEDICINE CLINIC | Age: 64
End: 2019-11-11

## 2019-11-11 DIAGNOSIS — R06.02 SOB (SHORTNESS OF BREATH) ON EXERTION: Primary | ICD-10-CM

## 2019-12-12 ENCOUNTER — PROCEDURE VISIT (OUTPATIENT)
Dept: DERMATOLOGY | Age: 64
End: 2019-12-12
Payer: COMMERCIAL

## 2019-12-12 DIAGNOSIS — L57.0 ACTINIC KERATOSES: Primary | ICD-10-CM

## 2019-12-12 PROCEDURE — 17000 DESTRUCT PREMALG LESION: CPT | Performed by: DERMATOLOGY

## 2020-01-06 RX ORDER — CELECOXIB 200 MG/1
CAPSULE ORAL
Qty: 180 CAPSULE | Refills: 3 | Status: ON HOLD
Start: 2020-01-06 | End: 2020-06-22 | Stop reason: HOSPADM

## 2020-03-13 ENCOUNTER — HOSPITAL ENCOUNTER (OUTPATIENT)
Age: 65
Discharge: HOME | End: 2020-03-13
Payer: COMMERCIAL

## 2020-03-13 ENCOUNTER — HOSPITAL ENCOUNTER (OUTPATIENT)
Dept: GENERAL RADIOLOGY | Age: 65
Discharge: HOME | End: 2020-03-13
Payer: COMMERCIAL

## 2020-03-13 PROCEDURE — 73630 X-RAY EXAM OF FOOT: CPT

## 2020-06-21 ENCOUNTER — HOSPITAL ENCOUNTER (INPATIENT)
Age: 65
LOS: 1 days | Discharge: HOME | DRG: 176 | End: 2020-06-22
Attending: INTERNAL MEDICINE | Admitting: INTERNAL MEDICINE
Payer: COMMERCIAL

## 2020-06-21 ENCOUNTER — HOSPITAL ENCOUNTER (OUTPATIENT)
Dept: CT IMAGING | Age: 65
Discharge: HOME | End: 2020-06-21
Payer: COMMERCIAL

## 2020-06-21 ENCOUNTER — HOSPITAL ENCOUNTER (OUTPATIENT)
Age: 65
Discharge: HOME | End: 2020-06-21
Payer: COMMERCIAL

## 2020-06-21 PROBLEM — I26.99 PULMONARY EMBOLISM, BILATERAL (HCC): Status: ACTIVE | Noted: 2020-06-21

## 2020-06-21 LAB
APTT: 202 SEC (ref 24.2–36.2)
APTT: 34.6 SEC (ref 24.2–36.2)
BASOPHILS ABSOLUTE: 0 K/UL (ref 0–0.2)
BASOPHILS RELATIVE PERCENT: 0.5 %
D DIMER: 575 NG/ML DDU (ref 0–229)
EOSINOPHILS ABSOLUTE: 0.1 K/UL (ref 0–0.6)
EOSINOPHILS RELATIVE PERCENT: 1.1 %
HCT VFR BLD CALC: 41 % (ref 36–48)
HCT VFR BLD CALC: 42.4 % (ref 36–48)
HEMOGLOBIN: 13.6 G/DL (ref 12–16)
HEMOGLOBIN: 13.9 G/DL (ref 12–16)
LYMPHOCYTES ABSOLUTE: 1.4 K/UL (ref 1–5.1)
LYMPHOCYTES RELATIVE PERCENT: 25.7 %
MCH RBC QN AUTO: 28.8 PG (ref 26–34)
MCH RBC QN AUTO: 28.8 PG (ref 26–34)
MCHC RBC AUTO-ENTMCNC: 32.8 G/DL (ref 31–36)
MCHC RBC AUTO-ENTMCNC: 33 G/DL (ref 31–36)
MCV RBC AUTO: 87 FL (ref 80–100)
MCV RBC AUTO: 87.7 FL (ref 80–100)
MONOCYTES ABSOLUTE: 0.4 K/UL (ref 0–1.3)
MONOCYTES RELATIVE PERCENT: 7.6 %
NEUTROPHILS ABSOLUTE: 3.6 K/UL (ref 1.7–7.7)
NEUTROPHILS RELATIVE PERCENT: 65.1 %
PDW BLD-RTO: 13 % (ref 12.4–15.4)
PDW BLD-RTO: 13.1 % (ref 12.4–15.4)
PLATELET # BLD: 213 K/UL (ref 135–450)
PLATELET # BLD: 239 K/UL (ref 135–450)
PMV BLD AUTO: 8.8 FL (ref 5–10.5)
PMV BLD AUTO: 9.3 FL (ref 5–10.5)
RBC # BLD: 4.72 M/UL (ref 4–5.2)
RBC # BLD: 4.84 M/UL (ref 4–5.2)
WBC # BLD: 5.6 K/UL (ref 4–11)
WBC # BLD: 6.6 K/UL (ref 4–11)

## 2020-06-21 PROCEDURE — 71275 CT ANGIOGRAPHY CHEST: CPT

## 2020-06-21 PROCEDURE — 85730 THROMBOPLASTIN TIME PARTIAL: CPT

## 2020-06-21 PROCEDURE — 6370000000 HC RX 637 (ALT 250 FOR IP): Performed by: INTERNAL MEDICINE

## 2020-06-21 PROCEDURE — G0378 HOSPITAL OBSERVATION PER HR: HCPCS

## 2020-06-21 PROCEDURE — G0379 DIRECT REFER HOSPITAL OBSERV: HCPCS

## 2020-06-21 PROCEDURE — 85025 COMPLETE CBC W/AUTO DIFF WBC: CPT

## 2020-06-21 PROCEDURE — 96365 THER/PROPH/DIAG IV INF INIT: CPT

## 2020-06-21 PROCEDURE — 36415 COLL VENOUS BLD VENIPUNCTURE: CPT

## 2020-06-21 PROCEDURE — 96366 THER/PROPH/DIAG IV INF ADDON: CPT

## 2020-06-21 PROCEDURE — 1200000000 HC SEMI PRIVATE

## 2020-06-21 PROCEDURE — 6360000004 HC RX CONTRAST MEDICATION: Performed by: FAMILY MEDICINE

## 2020-06-21 PROCEDURE — 85379 FIBRIN DEGRADATION QUANT: CPT

## 2020-06-21 PROCEDURE — 85027 COMPLETE CBC AUTOMATED: CPT

## 2020-06-21 PROCEDURE — 93005 ELECTROCARDIOGRAM TRACING: CPT | Performed by: INTERNAL MEDICINE

## 2020-06-21 PROCEDURE — 6360000002 HC RX W HCPCS: Performed by: INTERNAL MEDICINE

## 2020-06-21 RX ORDER — HEPARIN SODIUM 1000 [USP'U]/ML
5000 INJECTION, SOLUTION INTRAVENOUS; SUBCUTANEOUS ONCE
Status: DISCONTINUED | OUTPATIENT
Start: 2020-06-21 | End: 2020-06-22

## 2020-06-21 RX ORDER — ACETAMINOPHEN 325 MG/1
650 TABLET ORAL EVERY 6 HOURS PRN
Status: DISCONTINUED | OUTPATIENT
Start: 2020-06-21 | End: 2020-06-22 | Stop reason: HOSPADM

## 2020-06-21 RX ORDER — SODIUM CHLORIDE 0.9 % (FLUSH) 0.9 %
10 SYRINGE (ML) INJECTION EVERY 12 HOURS SCHEDULED
Status: DISCONTINUED | OUTPATIENT
Start: 2020-06-21 | End: 2020-06-22 | Stop reason: HOSPADM

## 2020-06-21 RX ORDER — SODIUM CHLORIDE 0.9 % (FLUSH) 0.9 %
10 SYRINGE (ML) INJECTION PRN
Status: DISCONTINUED | OUTPATIENT
Start: 2020-06-21 | End: 2020-06-22 | Stop reason: HOSPADM

## 2020-06-21 RX ORDER — HEPARIN SODIUM 1000 [USP'U]/ML
2500 INJECTION, SOLUTION INTRAVENOUS; SUBCUTANEOUS PRN
Status: DISCONTINUED | OUTPATIENT
Start: 2020-06-21 | End: 2020-06-22

## 2020-06-21 RX ORDER — ONDANSETRON 2 MG/ML
4 INJECTION INTRAMUSCULAR; INTRAVENOUS EVERY 6 HOURS PRN
Status: DISCONTINUED | OUTPATIENT
Start: 2020-06-21 | End: 2020-06-22 | Stop reason: HOSPADM

## 2020-06-21 RX ORDER — HEPARIN SODIUM 1000 [USP'U]/ML
5000 INJECTION, SOLUTION INTRAVENOUS; SUBCUTANEOUS PRN
Status: DISCONTINUED | OUTPATIENT
Start: 2020-06-21 | End: 2020-06-22

## 2020-06-21 RX ORDER — PROMETHAZINE HYDROCHLORIDE 25 MG/1
12.5 TABLET ORAL EVERY 6 HOURS PRN
Status: DISCONTINUED | OUTPATIENT
Start: 2020-06-21 | End: 2020-06-22 | Stop reason: HOSPADM

## 2020-06-21 RX ORDER — POLYETHYLENE GLYCOL 3350 17 G/17G
17 POWDER, FOR SOLUTION ORAL DAILY PRN
Status: DISCONTINUED | OUTPATIENT
Start: 2020-06-21 | End: 2020-06-22 | Stop reason: HOSPADM

## 2020-06-21 RX ORDER — HEPARIN SODIUM 10000 [USP'U]/100ML
5.6 INJECTION, SOLUTION INTRAVENOUS CONTINUOUS
Status: DISCONTINUED | OUTPATIENT
Start: 2020-06-21 | End: 2020-06-22

## 2020-06-21 RX ORDER — DIPHENHYDRAMINE HCL 25 MG
25 TABLET ORAL NIGHTLY PRN
Status: DISCONTINUED | OUTPATIENT
Start: 2020-06-21 | End: 2020-06-22 | Stop reason: HOSPADM

## 2020-06-21 RX ORDER — ACETAMINOPHEN 650 MG/1
650 SUPPOSITORY RECTAL EVERY 6 HOURS PRN
Status: DISCONTINUED | OUTPATIENT
Start: 2020-06-21 | End: 2020-06-22 | Stop reason: HOSPADM

## 2020-06-21 RX ADMIN — DIPHENHYDRAMINE HCL 25 MG: 25 TABLET ORAL at 22:39

## 2020-06-21 RX ADMIN — HEPARIN SODIUM 5000 UNITS: 1000 INJECTION INTRAVENOUS; SUBCUTANEOUS at 15:36

## 2020-06-21 RX ADMIN — HEPARIN SODIUM AND DEXTROSE 11.2 ML/HR: 10000; 5 INJECTION INTRAVENOUS at 15:40

## 2020-06-21 RX ADMIN — IOPAMIDOL 75 ML: 755 INJECTION, SOLUTION INTRAVENOUS at 09:45

## 2020-06-21 ASSESSMENT — PAIN DESCRIPTION - PAIN TYPE: TYPE: ACUTE PAIN

## 2020-06-21 ASSESSMENT — PAIN DESCRIPTION - ORIENTATION: ORIENTATION: RIGHT

## 2020-06-21 ASSESSMENT — PAIN SCALES - GENERAL: PAINLEVEL_OUTOF10: 1

## 2020-06-21 ASSESSMENT — PAIN DESCRIPTION - LOCATION: LOCATION: LEG

## 2020-06-21 NOTE — H&P
Hospital Medicine History & Physical      PCP: Raghav Amanda MD    Date of Admission: 6/21/2020    Date of Service: Pt seen/examined on 06/21/20  and  Placed in Observation. Chief Complaint:   DA from PCP (Dr. Gaylene Saint) office with B/L PE     History Of Present Illness:   59 y.o. female with PMH of pulmonary embolism 2 years ago , seen by Dr. Sudhir Dougherty [facility] hematology ) and completed treatment with Eliquis directly admitted by PCP (Dr. Gaylene Saint)  to [facility] for shortness of breath on exertion, which on further work-up showed patient had bilateral acute lower lobe PE. Patient was also reported to have tachycardia with minimal exertion. Patient reports that her symptoms started 3 to 4 days ago but worsened yesterday evening. She reported that she usually does a lot of activity and yesterday while working in her garden \" pruning her shrubs\" noticed that she was getting short of breath with minimal exertion. She denies cough, fever, chills, nausea, vomiting, diarrhea . She reports she had exactly similar symptoms when she had PE 2 years ago and hence called her PCP who ordered for CTPA, which revealed bilateral pulmonary embolism. Patient denies smoking, drinking, illicit drug use. Denies any recent surgery nor use of contraceptive/hormone replacement therapy    Dr. Gaylene Saint requested direct admit admission for patient to be started on heparin GTT and to be evaluated by hematology.   Of note, patient had prior thrombophilia work-up (protein C, protein S, prothrombin gene mutation, Antithrombin panel, lupus anticoagulant, anticardiolipin antibodies, homocystine levels) on 1/17/2019 which was reported to be negative (per Dr. Gaylene Saint)     Upon evaluation by me, patient comfortable appears to be in no distress, denies chest pain, palpitations    Past Medical History:      Diagnosis Date    Allergic rhinitis     Osteoarthritis     Osteoporosis     Pulmonary emboli ([address] 07/2018       Past  Hypertension Brother        REVIEW OF SYSTEMS:   Pertinent positives as noted in the HPI. All other systems reviewed and negative. PHYSICAL EXAM PERFORMED:  /85   Pulse 80   Temp 98.3 °F (36.8 °C) (Oral)   Resp 16   SpO2 96%     General appearance:  No apparent distress, appears stated age and cooperative. HEENT:  Normal cephalic, atraumatic without obvious deformity. Pupils equal, round, and reactive to light. Extra ocular muscles intact. Conjunctivae/corneas clear. Neck: Supple, with full range of motion. No jugular venous distention. Trachea midline. Respiratory:  Normal respiratory effort. Clear to auscultation, bilaterally without Rales/Wheezes/Rhonchi. Cardiovascular:  Regular rate and rhythm with normal S1/S2 without murmurs, rubs or gallops. Abdomen: Soft, non-tender, non-distended with normal bowel sounds. Musculoskeletal:  No clubbing, cyanosis or edema bilaterally. Full range of motion without deformity. Skin: Skin color, texture, turgor normal.  No rashes or lesions. Neurologic:  Neurovascularly intact without any focal sensory/motor deficits. Cranial nerves: II-XII intact, grossly non-focal.  Psychiatric:  Alert and oriented, thought content appropriate, normal insight  Capillary Refill: Brisk,< 3 seconds   Peripheral Pulses: +2 palpable, equal bilaterally       Labs:   Recent Labs     06/21/20  0826   WBC 5.6   HGB 13.9   HCT 42.4        EKG:  I have reviewed the EKG with the following interpretation: Ordered and pending    Radiology:    I have reviewed the Imaging  with the following interpretation:  CTA chest dated 6/21/2020   1. Acute bilateral lower lobe pulmonary thromboemboli without evidence of   right ventricular heart strain.        Active Hospital Problems    Diagnosis Date Noted    Pulmonary embolism, bilateral ([address] [I26.99] 06/21/2020    Multiple bilateral pulmonary emboli with R heart strain [I26.99] 07/01/2018     ASSESSMENT/PLAN:  1. SOBOE secondary to

## 2020-06-22 VITALS
BODY MASS INDEX: 21.97 KG/M2 | DIASTOLIC BLOOD PRESSURE: 83 MMHG | HEIGHT: 67 IN | TEMPERATURE: 97.9 F | SYSTOLIC BLOOD PRESSURE: 123 MMHG | HEART RATE: 82 BPM | WEIGHT: 140 LBS | OXYGEN SATURATION: 96 % | RESPIRATION RATE: 16 BRPM

## 2020-06-22 LAB
ANION GAP SERPL CALCULATED.3IONS-SCNC: 6 MMOL/L (ref 3–16)
APTT: 89.8 SEC (ref 24.2–36.2)
BUN BLDV-MCNC: 13 MG/DL (ref 7–20)
CALCIUM SERPL-MCNC: 9.2 MG/DL (ref 8.3–10.6)
CHLORIDE BLD-SCNC: 104 MMOL/L (ref 99–110)
CO2: 29 MMOL/L (ref 21–32)
CREAT SERPL-MCNC: <0.5 MG/DL (ref 0.6–1.2)
EKG ATRIAL RATE: 63 BPM
EKG DIAGNOSIS: NORMAL
EKG P AXIS: 39 DEGREES
EKG P-R INTERVAL: 218 MS
EKG Q-T INTERVAL: 412 MS
EKG QRS DURATION: 90 MS
EKG QTC CALCULATION (BAZETT): 421 MS
EKG R AXIS: -30 DEGREES
EKG T AXIS: 19 DEGREES
EKG VENTRICULAR RATE: 63 BPM
GFR AFRICAN AMERICAN: >60
GFR NON-AFRICAN AMERICAN: >60
GLUCOSE BLD-MCNC: 90 MG/DL (ref 70–99)
HCT VFR BLD CALC: 39.3 % (ref 36–48)
HEMOGLOBIN: 13.1 G/DL (ref 12–16)
MCH RBC QN AUTO: 28.9 PG (ref 26–34)
MCHC RBC AUTO-ENTMCNC: 33.3 G/DL (ref 31–36)
MCV RBC AUTO: 86.6 FL (ref 80–100)
PDW BLD-RTO: 13 % (ref 12.4–15.4)
PLATELET # BLD: 190 K/UL (ref 135–450)
PMV BLD AUTO: 9.1 FL (ref 5–10.5)
POTASSIUM REFLEX MAGNESIUM: 4.3 MMOL/L (ref 3.5–5.1)
RBC # BLD: 4.54 M/UL (ref 4–5.2)
SODIUM BLD-SCNC: 139 MMOL/L (ref 136–145)
WBC # BLD: 4.3 K/UL (ref 4–11)

## 2020-06-22 PROCEDURE — G0378 HOSPITAL OBSERVATION PER HR: HCPCS

## 2020-06-22 PROCEDURE — 93010 ELECTROCARDIOGRAM REPORT: CPT | Performed by: INTERNAL MEDICINE

## 2020-06-22 PROCEDURE — 80048 BASIC METABOLIC PNL TOTAL CA: CPT

## 2020-06-22 PROCEDURE — 85027 COMPLETE CBC AUTOMATED: CPT

## 2020-06-22 PROCEDURE — 36415 COLL VENOUS BLD VENIPUNCTURE: CPT

## 2020-06-22 PROCEDURE — 6370000000 HC RX 637 (ALT 250 FOR IP): Performed by: INTERNAL MEDICINE

## 2020-06-22 PROCEDURE — 85730 THROMBOPLASTIN TIME PARTIAL: CPT

## 2020-06-22 RX ADMIN — APIXABAN 10 MG: 5 TABLET, FILM COATED ORAL at 12:17

## 2020-06-22 ASSESSMENT — PAIN SCALES - GENERAL
PAINLEVEL_OUTOF10: 0
PAINLEVEL_OUTOF10: 0

## 2020-06-22 NOTE — CONSULTS
Hematology/Oncology Consultation         Patient:   Eric Starks       Reason for Consult:  Recurrent PE   Requesting Physician: Derick Wu*    Chief Complaint:     SOB                History Obtained from:     patient, electronic medical record    History of Present Illness:     Eric Starks is a 59 y.o. female  with significant past medical history of PE/DVT who presents with shortness of breath and bilateral PE without R heart strain. She has seen Dr. Nessa Wadsworth in the past for thrombus. She was worked up in 2018 with hyper coag work up and negative results. NO family hx. She is not on systemic estrogen. Non smoker. No family hx of clotting. We are asked to see patient to guide outpatient management of blood thinners and determine length of therapy. She denies chest pain, SOB, or heart palpitations this AM. Creat is 0.5. She would like to go back to to work tomorrow. NO family present.     Past Medical History:         Diagnosis Date    Allergic rhinitis     Osteoarthritis     Osteoporosis     Pulmonary emboli ([address] 07/2018       Past Surgical History:         Procedure Laterality Date    BUNIONECTOMY      COLONOSCOPY  01/2017    normal    HYSTERECTOMY      TUBAL LIGATION         Current Medications:     Current Facility-Administered Medications   Medication Dose Route Frequency Provider Last Rate Last Dose    sodium chloride flush 0.9 % injection 10 mL  10 mL Intravenous 2 times per day Tish Qiu MD        sodium chloride flush 0.9 % injection 10 mL  10 mL Intravenous PRN Tish Qiu MD        acetaminophen (TYLENOL) tablet 650 mg  650 mg Oral Q6H PRN Tish Qiu MD        Or    acetaminophen (TYLENOL) suppository 650 mg  650 mg Rectal Q6H PRN Tish Qiu MD        polyethylene glycol (GLYCOLAX) packet 17 g  17 g Oral Daily PRN Tish Qiu MD        promethazine (PHENERGAN) tablet 12.5 mg  12.5 mg Oral Q6H PRN James Reddy Intimate partner violence     Fear of current or ex partner: Not on file     Emotionally abused: Not on file     Physically abused: Not on file     Forced sexual activity: Not on file   Other Topics Concern    Not on file   Social History Narrative    Not on file     Social History     Substance and Sexual Activity   Drug Use No     Social History     Substance and Sexual Activity   Alcohol Use Yes    Comment: rarely     Social History     Substance and Sexual Activity   Sexual Activity Not Currently    Partners: Male     Social History     Tobacco Use   Smoking Status Never Smoker   Smokeless Tobacco Never Used       Family History:     Family History   Problem Relation Age of Onset    Cancer Mother 79        breast, NMSC    High Blood Pressure Mother     Cancer Father 61        colon ca, prostate    High Blood Pressure Father     Cancer Sister 36        breast    Hypertension Brother     Prostate Cancer Brother     Atrial Fibrillation Brother     Hypertension Brother        Review of Systems:     Constitutional: Denies fever, sweats, weight loss. .     Eyes: No visual changes or diplopia. No scleral icterus. ENT: No Headaches, no hearing loss, no  vertigo. No mouth sores or sore throat. Cardiovascular: No chest pain, no dyspnea on exertion, no palpitations, no  loss of consciousness. Respiratory: see HPI   Gastrointestinal: No abdominal pain, no appetite loss, no blood in stools. No change in bowel habits. Genitourinary: No dysuria, trouble voiding, or hematuria. Musculoskeletal:  Generalized weakness. No joint complaints. Integumentary: No rash or pruritis. Neurological: No headache, diplopia. No change in gait, balance, or coordination. No paresthesias. Endocrine: No temperature intolerance. No excessive thirst, fluid intake, or urination. Hematologic/Lymphatic: No abnormal bruising or ecchymoses, no blood clots or swollen lymph nodes. Allergic/Immunologic: No nasal congestion or hives. Component Value Date    MG 2.20 07/03/2018       PT/INR:    No results found for: PTINR    No results found for: INR    PTT:    Lab Results   Component Value Date    APTT 89.8 06/22/2020       U/A:    Lab Results   Component Value Date    NITRITE negative 08/24/2015    COLORU yellow 08/24/2015    PHUR 6.0 08/24/2015    CLARITYU clear 08/24/2015    SPECGRAV 1.015 08/24/2015    LEUKOCYTESUR negative 08/24/2015    BILIRUBINUR negative 08/24/2015    BLOODU negative 08/24/2015    GLUCOSEU negative 08/24/2015       Imaging:     EXAMINATION:   CTA OF THE CHEST 6/21/2020 9:28 am       TECHNIQUE:   CTA of the chest was performed after the administration of intravenous   contrast.  Multiplanar reformatted images are provided for review.  MIP   images are provided for review. Dose modulation, iterative reconstruction,   and/or weight based adjustment of the mA/kV was utilized to reduce the   radiation dose to as low as reasonably achievable.       COMPARISON:   07/01/2018       HISTORY:   ORDERING SYSTEM PROVIDED HISTORY: Shortness of breath   TECHNOLOGIST PROVIDED HISTORY:   Reason for exam:->SOB, H/O PE no longer on anticoagulation       FINDINGS:   Pulmonary Arteries: Motion artifact degrades image quality.  However, there   are more multiple filling defects within the segmental bilateral lower lobe   pulmonary arteries consistent with acute pulmonary thromboemboli.  There is   no evidence of right ventricular heart strain.       Mediastinum: Coronary artery calcifications are a marker of atherosclerosis. There are no enlarged thoracic lymph nodes.       Lungs/pleura:  The airway is patent. Jerrica Hood is no pneumothorax or pleural   effusion. Jerrica Hood is biapical scarring.  No change in the 3 mm solid left   upper lobe pulmonary nodule, no follow-up imaging is recommended.       Upper Abdomen: Images of the upper abdomen are unremarkable.       Soft Tissues/Bones: Degenerative changes involve the thoracic spine and   bilateral

## 2020-06-22 NOTE — PLAN OF CARE
Problem: Pain:  Goal: Pain level will decrease  Description: Pain level will decrease  Outcome: Ongoing     Problem: Cardiovascular  Goal: Anticoagulate/Hct stable  Outcome: Ongoing

## 2020-06-23 ENCOUNTER — CARE COORDINATION (OUTPATIENT)
Dept: OTHER | Facility: CLINIC | Age: 65
End: 2020-06-23

## 2020-06-25 ENCOUNTER — HOSPITAL ENCOUNTER (OUTPATIENT)
Dept: MAMMOGRAPHY | Age: 65
Discharge: HOME | End: 2020-06-25
Payer: COMMERCIAL

## 2020-06-25 PROCEDURE — 77067 SCR MAMMO BI INCL CAD: CPT

## 2020-07-10 ENCOUNTER — OFFICE VISIT (OUTPATIENT)
Dept: ORTHOPEDIC SURGERY | Age: 65
End: 2020-07-10
Payer: COMMERCIAL

## 2020-07-10 VITALS — BODY MASS INDEX: 21.97 KG/M2 | WEIGHT: 140 LBS | HEIGHT: 67 IN

## 2020-07-10 PROCEDURE — 99243 OFF/OP CNSLTJ NEW/EST LOW 30: CPT | Performed by: PHYSICIAN ASSISTANT

## 2020-07-10 NOTE — PROGRESS NOTES
Chief Complaint    Knee Pain (rt knee pain and stiffness, has some episodes where she has bent leg backwards, swelling)      History of Present Illness:  Mary Ferguson is a 59 y.o. female presents to the office today for a new problem. Patient sent in orthopedic consultation per Dr. Corey Ortiz. Patient is here with chief complaint of right medial and anterior knee pain. Over the last 2 weeks she has had 2 separate injuries. One was hyperextension and the other was a twisting injury after stepping in a hole. She states that originally after the above-mentioned injuries she was unable to bend her knee. She was complaining of pain and swelling. Over next several days things did gradually improve but she still symptomatic. No past medical history contributing to the region. Increased pain with twisting activities and stairs.       Pain Assessment  Location of Pain: Knee  Location Modifiers: Right  Severity of Pain: 7  Frequency of Pain: Intermittent  Aggravating Factors: Standing, Walking, Bending  Limiting Behavior: Some  Result of Injury: Yes  Work-Related Injury: No  Are there other pain locations you wish to document?: No    Medical History:  Past Medical History:   Diagnosis Date    Allergic rhinitis     Osteoarthritis     Osteoporosis     Pulmonary emboli ([address] 07/2018     Patient Active Problem List    Diagnosis Date Noted    Pulmonary embolism, bilateral ([address] 06/21/2020    History of pulmonary embolism 03/17/2019    Deep venous thrombosis ([address] 07/09/2018    Ventricular enlargement, right 07/09/2018    Chest pain 07/01/2018    SOB (shortness of breath) on exertion 07/01/2018    Multiple bilateral pulmonary emboli with R heart strain 07/01/2018    Menopause 06/11/2017    Pap smear of cervix reminder not needed forever 06/11/2017    Osteopenia 05/19/2016    History of total vaginal hysterectomy (TVH) 05/19/2016     Past Surgical History:   Procedure Laterality Date    BUNIONECTOMY  COLONOSCOPY  01/2017    normal    HYSTERECTOMY      TUBAL LIGATION       Family History   Problem Relation Age of Onset    Cancer Mother 79        breast, NMSC    High Blood Pressure Mother     Cancer Father 61        colon ca, prostate    High Blood Pressure Father     Cancer Sister 36        breast    Hypertension Brother     Prostate Cancer Brother     Atrial Fibrillation Brother     Hypertension Brother      Social History     Socioeconomic History    Marital status:       Spouse name: None    Number of children: 3    Years of education: None    Highest education level: None   Occupational History    Occupation: nurse   Social Needs    Financial resource strain: None    Food insecurity     Worry: None     Inability: None    Transportation needs     Medical: None     Non-medical: None   Tobacco Use    Smoking status: Never Smoker    Smokeless tobacco: Never Used   Substance and Sexual Activity    Alcohol use: Yes     Comment: rarely    Drug use: No    Sexual activity: Not Currently     Partners: Male   Lifestyle    Physical activity     Days per week: None     Minutes per session: None    Stress: None   Relationships    Social connections     Talks on phone: None     Gets together: None     Attends Islam service: None     Active member of club or organization: None     Attends meetings of clubs or organizations: None     Relationship status: None    Intimate partner violence     Fear of current or ex partner: None     Emotionally abused: None     Physically abused: None     Forced sexual activity: None   Other Topics Concern    None   Social History Narrative    None     Current Outpatient Medications   Medication Sig Dispense Refill    apixaban (ELIQUIS) 5 MG TABS tablet Take 1 tablet by mouth 2 times daily 180 tablet 3    famciclovir (FAMVIR) 500 MG tablet Take 2 tablets twice a day per fever blister outbreak 20 tablet 0    DiphenhydrAMINE HCl (BENADRYL ALLERGY PO) Take 25 mg by mouth nightly      Cholecalciferol (VITAMIN D) 2000 UNITS CAPS capsule Take 2,000 Units by mouth      Calcium Carbonate-Vitamin D (CALCIUM-VITAMIN D) 500-200 MG-UNIT per tablet Take 1 tablet by mouth 2 times daily (with meals).  Glucosamine-Chondroit-Vit C-Mn (GLUCOSAMINE 1500 COMPLEX) CAPS Take 2 tablets by mouth daily.  therapeutic multivitamin-minerals (THERAGRAN-M) tablet Take 1 tablet by mouth daily. No current facility-administered medications for this visit. Review of Systems:  Relevant review of systems reviewed and available in the patient's chart    Vital Signs:  Ht 5' 6.5\" (1.689 m)   Wt 140 lb (63.5 kg)   BMI 22.26 kg/m²     General Exam:   Constitutional: Patient is adequately groomed with no evidence of malnutrition  DTRs: Deep tendon reflexes are intact  Mental Status: The patient is oriented to time, place and person. The patient's mood and affect are appropriate. Lymphatic: The lymphatic examination bilaterally reveals all areas to be without enlargement or induration. Vascular: Examination reveals no swelling or calf tenderness. Peripheral pulses are palpable and 2+. Neurological: The patient has good coordination. There is no weakness or sensory deficit. Body mass index is 22.26 kg/m². Right knee Examination:    Inspection:  No swelling, ecchymosis or deformity    Palpation:  Tenderness to palpation directly over the medial joint line    Range of Motion:  Well-preserved range of motion, 0-120°. Strength:  4+/5 quad and hamstring strength    Special Tests:  Positive Jorge's examination. Stable to varus and valgus stress testing. Negative Lachman's exam.  Positive patellofemoral grind test    Skin: There are no rashes, ulcerations or lesions.     Gait: Mild antalgic gait    Reflex normal deep tendon reflexes    Additional Comments:       Additional Examinations:         Contralateral Exam: Examination of the left knee reveals intact skin. There is no focal tenderness. The patient demonstrates full painless range of motion with regard to flexion and extension. Strength is 5/5 throughout all planes. Ligamentous stability is grossly intact. Examination of the right and hip reveals intact skin. The patient demonstrates full painless range of motion with regards to flexion, abduction, internal and external rotation. There is no tenderness about the greater trochanter. There is a negative straight leg raise against resistance. Strength is 5/5 throughout all planes. Radiology:     X-rays obtained and reviewed in office:  Views 4 views including AP weightbearing, PA flexed weightbearing, lateral, and skyline  Location right knee  Impression mild thinning of the lateral patella facet. Medial lateral joint spaces well-preserved. No evidence of fractures or dislocations. Impression:  Encounter Diagnosis   Name Primary?  Right knee pain, unspecified chronicity Yes       Office Procedures:  Orders Placed This Encounter   Procedures    XR KNEE RIGHT (MIN 4 VIEWS)     Standing Status:   Future     Number of Occurrences:   1     Standing Expiration Date:   7/10/2021       Treatment Plan: Patient is suffering from patellofemoral syndrome and possible medial meniscus tear. Patient is given a home exercise program for quadricep strengthening exercises. I have also ordered her an MRI to evaluate for medial meniscus tear. She can call for MRI results a few days after her MRI appointment.

## 2020-07-17 ENCOUNTER — HOSPITAL ENCOUNTER (OUTPATIENT)
Dept: MRI IMAGING | Age: 65
Discharge: HOME | End: 2020-07-17
Payer: COMMERCIAL

## 2020-07-17 PROCEDURE — 73721 MRI JNT OF LWR EXTRE W/O DYE: CPT

## 2020-07-20 ENCOUNTER — TELEPHONE (OUTPATIENT)
Dept: ORTHOPEDIC SURGERY | Age: 65
End: 2020-07-20

## 2020-07-20 NOTE — TELEPHONE ENCOUNTER
Spoke to patient. Informed her of MRI results. She will prefer to come in for visit and discuss it in more detail when she gets back from vacation. Jessi can you mail her home quad and hamstring strengthening exercises.

## 2020-08-27 ENCOUNTER — HOSPITAL ENCOUNTER (OUTPATIENT)
Dept: VASCULAR LAB | Age: 65
Discharge: HOME | End: 2020-08-27
Payer: COMMERCIAL

## 2020-08-27 PROCEDURE — 93971 EXTREMITY STUDY: CPT

## 2020-09-01 ENCOUNTER — OFFICE VISIT (OUTPATIENT)
Dept: ORTHOPEDIC SURGERY | Age: 65
End: 2020-09-01
Payer: MEDICARE

## 2020-09-01 VITALS — WEIGHT: 140 LBS | BODY MASS INDEX: 21.97 KG/M2 | HEIGHT: 67 IN

## 2020-09-01 PROCEDURE — G8420 CALC BMI NORM PARAMETERS: HCPCS | Performed by: PHYSICIAN ASSISTANT

## 2020-09-01 PROCEDURE — G8427 DOCREV CUR MEDS BY ELIG CLIN: HCPCS | Performed by: PHYSICIAN ASSISTANT

## 2020-09-01 PROCEDURE — 99213 OFFICE O/P EST LOW 20 MIN: CPT | Performed by: PHYSICIAN ASSISTANT

## 2020-09-01 PROCEDURE — 3017F COLORECTAL CA SCREEN DOC REV: CPT | Performed by: PHYSICIAN ASSISTANT

## 2020-09-01 PROCEDURE — 1036F TOBACCO NON-USER: CPT | Performed by: PHYSICIAN ASSISTANT

## 2020-09-01 RX ORDER — BUPIVACAINE HYDROCHLORIDE 5 MG/ML
30 INJECTION, SOLUTION PERINEURAL ONCE
Status: COMPLETED | OUTPATIENT
Start: 2020-09-01 | End: 2020-09-01

## 2020-09-01 RX ORDER — BETAMETHASONE SODIUM PHOSPHATE AND BETAMETHASONE ACETATE 3; 3 MG/ML; MG/ML
12 INJECTION, SUSPENSION INTRA-ARTICULAR; INTRALESIONAL; INTRAMUSCULAR; SOFT TISSUE ONCE
Status: COMPLETED | OUTPATIENT
Start: 2020-09-01 | End: 2020-09-01

## 2020-09-01 RX ADMIN — BETAMETHASONE SODIUM PHOSPHATE AND BETAMETHASONE ACETATE 12 MG: 3; 3 INJECTION, SUSPENSION INTRA-ARTICULAR; INTRALESIONAL; INTRAMUSCULAR; SOFT TISSUE at 14:56

## 2020-09-01 RX ADMIN — BUPIVACAINE HYDROCHLORIDE 150 MG: 5 INJECTION, SOLUTION PERINEURAL at 14:57

## 2020-09-01 NOTE — PROGRESS NOTES
Chief Complaint    Knee Pain (RIGHT knee pain persist; occasional catching pain with buckling episodes; discussed MRI results over phone)      History of Present Illness:  Puneet Rincon is a 59 y.o. female presents to the office today for a follow-up visit. She has had 2 separate injuries. One was hyperextension and the other was a twisting injury after stepping in a hole. She states that originally after the above-mentioned injuries she was unable to bend her knee. She was complaining of pain and swelling. Over next several days things did gradually improve but she still symptomatic. No past medical history contributing to the region. Increased pain with twisting activities and stairs.       Pain Assessment  Location of Pain: Knee  Location Modifiers: Right  Severity of Pain: 9  Quality of Pain: Sharp, Buckling  Duration of Pain: Persistent  Frequency of Pain: Intermittent  Aggravating Factors: Walking, Squatting, Standing  Limiting Behavior: Some  Relieving Factors: Rest    Medical History:  Past Medical History:   Diagnosis Date    Allergic rhinitis     Osteoarthritis     Osteoporosis     Pulmonary emboli ([address] 07/2018     Patient Active Problem List    Diagnosis Date Noted    Pulmonary embolism, bilateral ([address] 06/21/2020    History of pulmonary embolism 03/17/2019    Deep venous thrombosis ([address] 07/09/2018    Ventricular enlargement, right 07/09/2018    Chest pain 07/01/2018    SOB (shortness of breath) on exertion 07/01/2018    Multiple bilateral pulmonary emboli with R heart strain 07/01/2018    Menopause 06/11/2017    Pap smear of cervix reminder not needed forever 06/11/2017    Osteopenia 05/19/2016    History of total vaginal hysterectomy (TVH) 05/19/2016     Past Surgical History:   Procedure Laterality Date    BUNIONECTOMY      COLONOSCOPY  01/2017    normal    HYSTERECTOMY      TUBAL LIGATION       Family History   Problem Relation Age of Onset    Cancer Mother 79 breast, NMSC    High Blood Pressure Mother     Cancer Father 61        colon ca, prostate    High Blood Pressure Father     Cancer Sister 36        breast    Hypertension Brother     Prostate Cancer Brother     Atrial Fibrillation Brother     Hypertension Brother      Social History     Socioeconomic History    Marital status:       Spouse name: None    Number of children: 3    Years of education: None    Highest education level: None   Occupational History    Occupation: nurse   Social Needs    Financial resource strain: None    Food insecurity     Worry: None     Inability: None    Transportation needs     Medical: None     Non-medical: None   Tobacco Use    Smoking status: Never Smoker    Smokeless tobacco: Never Used   Substance and Sexual Activity    Alcohol use: Yes     Comment: rarely    Drug use: No    Sexual activity: Not Currently     Partners: Male   Lifestyle    Physical activity     Days per week: None     Minutes per session: None    Stress: None   Relationships    Social connections     Talks on phone: None     Gets together: None     Attends Orthodoxy service: None     Active member of club or organization: None     Attends meetings of clubs or organizations: None     Relationship status: None    Intimate partner violence     Fear of current or ex partner: None     Emotionally abused: None     Physically abused: None     Forced sexual activity: None   Other Topics Concern    None   Social History Narrative    None     Current Outpatient Medications   Medication Sig Dispense Refill    apixaban (ELIQUIS) 5 MG TABS tablet Take 1 tablet by mouth 2 times daily 180 tablet 3    famciclovir (FAMVIR) 500 MG tablet Take 2 tablets twice a day per fever blister outbreak 20 tablet 0    DiphenhydrAMINE HCl (BENADRYL ALLERGY PO) Take 25 mg by mouth nightly      Cholecalciferol (VITAMIN D) 2000 UNITS CAPS capsule Take 2,000 Units by mouth      Calcium Carbonate-Vitamin D throughout all planes. Ligamentous stability is grossly intact. Examination of the right and hip reveals intact skin. The patient demonstrates full painless range of motion with regards to flexion, abduction, internal and external rotation. There is no tenderness about the greater trochanter. There is a negative straight leg raise against resistance. Strength is 5/5 throughout all planes. Radiology:       EXAMINATION:    MRI OF THE RIGHT KNEE WITHOUT CONTRAST, 7/17/2020 9:48 am         TECHNIQUE:    Multiplanar multisequence MRI of the right knee was performed without the    administration of intravenous contrast.         COMPARISON:    None.         HISTORY:    ORDERING SYSTEM PROVIDED HISTORY: Right knee pain, unspecified chronicity    TECHNOLOGIST PROVIDED HISTORY:    Reason for Exam: knee pain wo injury    Acuity: Acute    Type of Exam: Initial         FINDINGS:    MENISCI: Lateral meniscus is intact.  Increased T2 signal noted within the    posterior horn of the medial meniscus with poor visualization of the root    concerning for tear.  Mild extrusion of the body.         CRUCIATE LIGAMENTS: The anterior and posterior cruciate ligaments are normal    in signal, morphology and course.         EXTENSOR MECHANISM: The quadriceps and patellar tendons are intact.  The    medial and lateral patellar retinacula are intact.         LATERAL COLLATERAL LIGAMENT COMPLEX: The popliteus tendon, biceps femoris    tendon, fibular collateral ligament and iliotibial band are intact.         MEDIAL COLLATERAL LIGAMENT COMPLEX: The superficial and deep components of    the medial collateral ligament are intact.         KNEE JOINT: Small joint effusion.  Grade 3-4 chondral loss along the patella    most prominent at the apex where there are subchondral cysts.  Similar    regions of chondral loss medial trochlea.  Focal high-grade chondral loss    posteromedial tibial plateau with subchondral cyst.         BONE MARROW: No acute fracture.  No suspicious marrow replacing lesion.              Impression    Degenerative signal noted within the posterior horn medial meniscus with    inner free margin fraying and poor visualization of the root concerning for    tear.  Mild extrusion of the body.         Small joint effusion.         Patellar and medial compartment chondromalacia. Impression:  Encounter Diagnosis   Name Primary?  Primary osteoarthritis of right knee Yes       Office Procedures:  Orders Placed This Encounter   Procedures    US ARTHR/ASP/INJ MAJOR JNT/BURSA RIGHT     Standing Status:   Future     Number of Occurrences:   1     Standing Expiration Date:   9/1/2021     Right knee cortisone injection    I discussed in detail the risks, benefits and complications of aninjection which included but are not limited to infection, skin reactions, hot swollen joint, and anaphylaxis with the patient. The patient verbalized understanding and gave informed consent for the right knee injection. The patient is placed supine on table with a bolster underneath knee. Knee prepped with Betadine/Sterile alcohol solution. A sterile 22-gauge needle was inserted into the knee and the mixture of 2ml Beta-Beta, 3 mL of 0.5% bupivacaine was injected under sterile technique. The needle was withdrawn and the puncture site sealed with a Band-Aid. Technique: Under sterile conditions a Son[employer] ultrasound unit with a variable frequency (6.0-15.0 MHz) linear transducer was used to localize the placement of a 22-gauge needle into the knee joint. Findings: Successful needle placement for knee injection. Final images were taken and saved for permanent record. The patient tolerated the injection well. The patient was instructed to call the office immediately if there is any pain, redness, warmth, fever, or chills. Treatment Plan: Patient has only a questionable small meniscal tear on MRI.   I believe her symptoms are more related to her patellofemoral chondromalacia and osteoarthritis medial compartment. We will perform a cortisone injection today. If she does not do well she will follow-up in the office. If she does well we have also discussed viscosupplementation injections for future treatment. She already takes Celebrex and is on Eliquis.

## 2020-09-14 ENCOUNTER — NURSE ONLY (OUTPATIENT)
Dept: FAMILY MEDICINE CLINIC | Age: 65
End: 2020-09-14
Payer: MEDICARE

## 2020-09-14 PROCEDURE — 90732 PPSV23 VACC 2 YRS+ SUBQ/IM: CPT | Performed by: FAMILY MEDICINE

## 2020-09-14 PROCEDURE — G0009 ADMIN PNEUMOCOCCAL VACCINE: HCPCS | Performed by: FAMILY MEDICINE

## 2020-09-28 NOTE — TELEPHONE ENCOUNTER
I have sent a refill request to you for my Eliquis,  this needs to go to my new mail away [address] fax number is [phone] and their phone is [phone], I have set up an account with them, it needs to be a 90 day supply please.   Thanks, Marcel Murray

## 2020-10-12 ENCOUNTER — OFFICE VISIT (OUTPATIENT)
Dept: ORTHOPEDIC SURGERY | Age: 65
End: 2020-10-12
Payer: MEDICARE

## 2020-10-12 VITALS — BODY MASS INDEX: 22.5 KG/M2 | HEIGHT: 66 IN | WEIGHT: 139.99 LBS

## 2020-10-12 PROCEDURE — 1090F PRES/ABSN URINE INCON ASSESS: CPT | Performed by: ORTHOPAEDIC SURGERY

## 2020-10-12 PROCEDURE — 3017F COLORECTAL CA SCREEN DOC REV: CPT | Performed by: ORTHOPAEDIC SURGERY

## 2020-10-12 PROCEDURE — G8427 DOCREV CUR MEDS BY ELIG CLIN: HCPCS | Performed by: ORTHOPAEDIC SURGERY

## 2020-10-12 PROCEDURE — G8420 CALC BMI NORM PARAMETERS: HCPCS | Performed by: ORTHOPAEDIC SURGERY

## 2020-10-12 PROCEDURE — 1036F TOBACCO NON-USER: CPT | Performed by: ORTHOPAEDIC SURGERY

## 2020-10-12 PROCEDURE — 4040F PNEUMOC VAC/ADMIN/RCVD: CPT | Performed by: ORTHOPAEDIC SURGERY

## 2020-10-12 PROCEDURE — G8399 PT W/DXA RESULTS DOCUMENT: HCPCS | Performed by: ORTHOPAEDIC SURGERY

## 2020-10-12 PROCEDURE — 1123F ACP DISCUSS/DSCN MKR DOCD: CPT | Performed by: ORTHOPAEDIC SURGERY

## 2020-10-12 PROCEDURE — 99213 OFFICE O/P EST LOW 20 MIN: CPT | Performed by: ORTHOPAEDIC SURGERY

## 2020-10-12 PROCEDURE — G8484 FLU IMMUNIZE NO ADMIN: HCPCS | Performed by: ORTHOPAEDIC SURGERY

## 2020-10-12 NOTE — PROGRESS NOTES
[facility]) Dermatology  Genia Harry MD  [phone]      Chino Garrido  1955    72 y.o. female     Date of Visit: 10/15/2020    Last Visit: 1yr    Chief Complaint: Skin check    History of Present Illness:  1. Here for skin/mole check. No new moles. No moles changing in size, shape, color. No moles associated w/ pain, bleeding, pruritus.   -Hx moderately dysplastic nevus, mid L lateral lower leg - excised via biopsy 10/2019  -Spends a lot of time outdoors at [employer], [place]. Occasionally uses hats, SPF 30 sunscreen     2. History of actinic keratoses s/p cryotherapy. Reports a rough lesion under chin      Review of Systems:  Constitutional: Reports general sense of well-being. Skin: No interval severe sunburns. Allergies: Reviewed and updated. Past Medical History, Surgical History, Medications and Allergies reviewed. Past Medical History:   Diagnosis Date    Allergic rhinitis     Osteoarthritis     Osteoporosis     Pulmonary emboli ([address] 07/2018     Past Surgical History:   Procedure Laterality Date    BUNIONECTOMY      COLONOSCOPY  01/2017    normal    HYSTERECTOMY      TUBAL LIGATION         Allergies   Allergen Reactions    Erythromycin Nausea Only     Outpatient Medications Marked as Taking for the 10/15/20 encounter (Office Visit) with Genia Harry MD   Medication Sig Dispense Refill    apixaban (ELIQUIS) 5 MG TABS tablet Take 1 tablet by mouth 2 times daily 180 tablet 3    famciclovir (FAMVIR) 500 MG tablet Take 2 tablets twice a day per fever blister outbreak 20 tablet 0    DiphenhydrAMINE HCl (BENADRYL ALLERGY PO) Take 25 mg by mouth nightly      Cholecalciferol (VITAMIN D) 2000 UNITS CAPS capsule Take 2,000 Units by mouth      Calcium Carbonate-Vitamin D (CALCIUM-VITAMIN D) 500-200 MG-UNIT per tablet Take 1 tablet by mouth 2 times daily (with meals).  Glucosamine-Chondroit-Vit C-Mn (GLUCOSAMINE 1500 COMPLEX) CAPS Take 2 tablets by mouth daily.         therapeutic multivitamin-minerals (THERAGRAN-M) tablet Take 1 tablet by mouth daily. Social History: [employer] w/ Dr. Jesus Wakefield - hoping to retire this year.  passed 2018. 14 grandchildren     Physical Examination     The following were examined and determined to be normal: Psych/Neuro, Scalp/hair, Head/face, Conjunctivae/eyelids, Gums/teeth/lips, Neck, Nails/digits and Genitalia/groin/buttocks. The following were examined and determined to be abnormal: Breast/axilla/chest, Abdomen, Back, RUE, LUE, RLE and LLE. -General: Well-appearing, NAD  1. Scattered on the trunk and extremities are multiple well-defined round and oval symmetric smoothly-bordered uniformly brown macules and papules. 2. L temple 1, L cheek 1, submental 1, R nasal sidewall 1 - ill-defined irregularly-shaped roughly-scaling thin pink macule(s)/papule(s)     Assessment and Plan     1. Benign acquired melanocytic nevi / hx dysplastic nevus   -Recommend monthly self skin exams   -Educated regarding the ABCDEs of melanoma detection   -Call for any new/changing moles or concerning lesions  -Reviewed sun protective behavior -- sun avoidance during the peak hours of the day, sun-protective clothing (including hat and sunglasses), sunscreen use (water resistant, broad spectrum, SPF at least 30, need for reapplication every 2 to 3 hours), avoidance of tanning beds   -Return for full skin exam in 1 year (sooner if indicated)     2. Actinic keratosis(es)  -Edu re: relationship with chronic cumulative sun exposure, low premalignant potential.   -4 lesion(s) treated w/ liquid nitrogen x 2 cycles - L temple 1, L cheek 1, submental 1, R nasal sidewall 1 . Edu re: risk of blister formation, discomfort, scar, hypopigmentation. Discussed wound care.

## 2020-10-12 NOTE — PROGRESS NOTES
Chief Complaint    Knee Pain (Right knee)      History of Present Illness:  Paolo Wilkerson is a 72 y.o. female returns today for a follow-up on her right knee. Overall, she does seem to be doing better. Unfortunately the cortisone injection that she received on her last visit only provided about 2 days of relief. She continues to have anterior knee pain particularly when going up and down steps or when extending her knee. She does not really account for a lot of medial knee pain. Her previous MRI results showed chondral changes of the patellofemoral joint with a suspected tear of the medial meniscus.     Pain Assessment  Location of Pain: Knee  Location Modifiers: Right  Severity of Pain: 1  Quality of Pain: Sharp, Dull, Aching  Duration of Pain: Persistent  Frequency of Pain: Constant  Aggravating Factors: Stairs, Walking, Standing, Squatting, Kneeling, Exercise, Straightening, Stretching, Bending  Limiting Behavior: Yes  Relieving Factors: Rest  Result of Injury: No  Work-Related Injury: No  Are there other pain locations you wish to document?: No    Medical History:  Past Medical History:   Diagnosis Date    Allergic rhinitis     Osteoarthritis     Osteoporosis     Pulmonary emboli ([address] 07/2018     Patient Active Problem List    Diagnosis Date Noted    Pulmonary embolism, bilateral ([address] 06/21/2020    History of pulmonary embolism 03/17/2019    Deep venous thrombosis ([address] 07/09/2018    Ventricular enlargement, right 07/09/2018    Chest pain 07/01/2018    SOB (shortness of breath) on exertion 07/01/2018    Multiple bilateral pulmonary emboli with R heart strain 07/01/2018    Menopause 06/11/2017    Pap smear of cervix reminder not needed forever 06/11/2017    Osteopenia 05/19/2016    History of total vaginal hysterectomy (TVH) 05/19/2016     Past Surgical History:   Procedure Laterality Date    BUNIONECTOMY      COLONOSCOPY  01/2017    normal    HYSTERECTOMY      TUBAL LIGATION Family History   Problem Relation Age of Onset    Cancer Mother 79        breast, NMSC    High Blood Pressure Mother     Cancer Father 61        colon ca, prostate    High Blood Pressure Father     Cancer Sister 36        breast    Hypertension Brother     Prostate Cancer Brother     Atrial Fibrillation Brother     Hypertension Brother      Social History     Socioeconomic History    Marital status:       Spouse name: None    Number of children: 3    Years of education: None    Highest education level: None   Occupational History    Occupation: nurse   Social Needs    Financial resource strain: None    Food insecurity     Worry: None     Inability: None    Transportation needs     Medical: None     Non-medical: None   Tobacco Use    Smoking status: Never Smoker    Smokeless tobacco: Never Used   Substance and Sexual Activity    Alcohol use: Yes     Comment: rarely    Drug use: No    Sexual activity: Not Currently     Partners: Male   Lifestyle    Physical activity     Days per week: None     Minutes per session: None    Stress: None   Relationships    Social connections     Talks on phone: None     Gets together: None     Attends Temple service: None     Active member of club or organization: None     Attends meetings of clubs or organizations: None     Relationship status: None    Intimate partner violence     Fear of current or ex partner: None     Emotionally abused: None     Physically abused: None     Forced sexual activity: None   Other Topics Concern    None   Social History Narrative    None     Current Outpatient Medications   Medication Sig Dispense Refill    apixaban (ELIQUIS) 5 MG TABS tablet Take 1 tablet by mouth 2 times daily 180 tablet 3    famciclovir (FAMVIR) 500 MG tablet Take 2 tablets twice a day per fever blister outbreak 20 tablet 0    DiphenhydrAMINE HCl (BENADRYL ALLERGY PO) Take 25 mg by mouth nightly      Cholecalciferol (VITAMIN D) 2000 UNITS CAPS capsule Take 2,000 Units by mouth      Calcium Carbonate-Vitamin D (CALCIUM-VITAMIN D) 500-200 MG-UNIT per tablet Take 1 tablet by mouth 2 times daily (with meals).  Glucosamine-Chondroit-Vit C-Mn (GLUCOSAMINE 1500 COMPLEX) CAPS Take 2 tablets by mouth daily.  therapeutic multivitamin-minerals (THERAGRAN-M) tablet Take 1 tablet by mouth daily. No current facility-administered medications for this visit. Review of Systems:  Relevant review of systems reviewed and available in the patient's chart    Vital Signs:  Ht 5' 6.5\" (1.689 m)   Wt 139 lb 15.9 oz (63.5 kg)   BMI 22.26 kg/m²     General Exam:   Constitutional: Patient is adequately groomed with no evidence of malnutrition  DTRs: Deep tendon reflexes are intact  Mental Status: The patient is oriented to time, place and person. The patient's mood and affect are appropriate. Lymphatic: The lymphatic examination bilaterally reveals all areas to be without enlargement or induration. Vascular: Examination reveals no swelling or calf tenderness. Peripheral pulses are palpable and 2+. Neurological: The patient has good coordination. There is no weakness or sensory deficit. Body mass index is 22.26 kg/m². Right knee Examination:    Inspection:  No swelling, ecchymosis or deformity    Palpation:  Mild tenderness to palpation directly over the medial joint line    Range of Motion:  Well-preserved range of motion, 0-120°. Retropatellar crepitation. Strength:  4+/5 quad and hamstring strength    Special Tests:  Negative Jorge's examination. Stable to varus and valgus stress testing. Negative Lachman's exam.  Positive patellofemoral grind test    Skin: There are no rashes, ulcerations or lesions.     Gait: Mild antalgic gait    Reflex normal deep tendon reflexes      Radiology:       EXAMINATION:    MRI OF THE RIGHT KNEE WITHOUT CONTRAST, 7/17/2020 9:48 am         TECHNIQUE:    Multiplanar multisequence MRI of the right knee was performed without the    administration of intravenous contrast.         COMPARISON:    None.         HISTORY:    ORDERING SYSTEM PROVIDED HISTORY: Right knee pain, unspecified chronicity    TECHNOLOGIST PROVIDED HISTORY:    Reason for Exam: knee pain wo injury    Acuity: Acute    Type of Exam: Initial         FINDINGS:    MENISCI: Lateral meniscus is intact.  Increased T2 signal noted within the    posterior horn of the medial meniscus with poor visualization of the root    concerning for tear.  Mild extrusion of the body.         CRUCIATE LIGAMENTS: The anterior and posterior cruciate ligaments are normal    in signal, morphology and course.         EXTENSOR MECHANISM: The quadriceps and patellar tendons are intact. The    medial and lateral patellar retinacula are intact.         LATERAL COLLATERAL LIGAMENT COMPLEX: The popliteus tendon, biceps femoris    tendon, fibular collateral ligament and iliotibial band are intact.         MEDIAL COLLATERAL LIGAMENT COMPLEX: The superficial and deep components of    the medial collateral ligament are intact.         KNEE JOINT: Small joint effusion.  Grade 3-4 chondral loss along the patella    most prominent at the apex where there are subchondral cysts.  Similar    regions of chondral loss medial trochlea.  Focal high-grade chondral loss    posteromedial tibial plateau with subchondral cyst.         BONE MARROW: No acute fracture.  No suspicious marrow replacing lesion.              Impression    Degenerative signal noted within the posterior horn medial meniscus with    inner free margin fraying and poor visualization of the root concerning for    tear.  Mild extrusion of the body.         Small joint effusion.         Patellar and medial compartment chondromalacia. Impression:  Encounter Diagnosis   Name Primary?     Patellofemoral arthritis of right knee Yes       Office Procedures:  No orders of the defined types were placed in this

## 2020-10-15 ENCOUNTER — OFFICE VISIT (OUTPATIENT)
Dept: DERMATOLOGY | Age: 65
End: 2020-10-15
Payer: MEDICARE

## 2020-10-15 VITALS — TEMPERATURE: 97.9 F

## 2020-10-15 PROCEDURE — 1036F TOBACCO NON-USER: CPT | Performed by: DERMATOLOGY

## 2020-10-15 PROCEDURE — 3017F COLORECTAL CA SCREEN DOC REV: CPT | Performed by: DERMATOLOGY

## 2020-10-15 PROCEDURE — G8484 FLU IMMUNIZE NO ADMIN: HCPCS | Performed by: DERMATOLOGY

## 2020-10-15 PROCEDURE — 4040F PNEUMOC VAC/ADMIN/RCVD: CPT | Performed by: DERMATOLOGY

## 2020-10-15 PROCEDURE — G8420 CALC BMI NORM PARAMETERS: HCPCS | Performed by: DERMATOLOGY

## 2020-10-15 PROCEDURE — 17003 DESTRUCT PREMALG LES 2-14: CPT | Performed by: DERMATOLOGY

## 2020-10-15 PROCEDURE — 1090F PRES/ABSN URINE INCON ASSESS: CPT | Performed by: DERMATOLOGY

## 2020-10-15 PROCEDURE — 1123F ACP DISCUSS/DSCN MKR DOCD: CPT | Performed by: DERMATOLOGY

## 2020-10-15 PROCEDURE — 17000 DESTRUCT PREMALG LESION: CPT | Performed by: DERMATOLOGY

## 2020-10-15 PROCEDURE — G8399 PT W/DXA RESULTS DOCUMENT: HCPCS | Performed by: DERMATOLOGY

## 2020-10-15 PROCEDURE — G8427 DOCREV CUR MEDS BY ELIG CLIN: HCPCS | Performed by: DERMATOLOGY

## 2020-10-15 PROCEDURE — 99213 OFFICE O/P EST LOW 20 MIN: CPT | Performed by: DERMATOLOGY

## 2020-10-19 ENCOUNTER — NURSE ONLY (OUTPATIENT)
Dept: FAMILY MEDICINE CLINIC | Age: 65
End: 2020-10-19
Payer: MEDICARE

## 2020-10-19 PROCEDURE — G0008 ADMIN INFLUENZA VIRUS VAC: HCPCS | Performed by: FAMILY MEDICINE

## 2020-10-19 PROCEDURE — 90686 IIV4 VACC NO PRSV 0.5 ML IM: CPT | Performed by: FAMILY MEDICINE

## 2020-11-23 RX ORDER — CELECOXIB 200 MG/1
CAPSULE ORAL
Qty: 180 CAPSULE | Refills: 3 | Status: SHIPPED | OUTPATIENT
Start: 2020-11-23 | End: 2021-11-29 | Stop reason: SDUPTHER

## 2020-11-23 NOTE — TELEPHONE ENCOUNTER
Bismark Gardiner MD 1 hour ago (5:21 AM)          I would like a refill on my Celebrex 200 mg bid but it does not appear on my med list to send a refill request.  I need a 90 day supply sent to [facility] [address] and if you would put in the comment section to fill with [facility] pricing instead of any insurance.    Thanks

## 2021-01-27 ENCOUNTER — TELEPHONE (OUTPATIENT)
Dept: FAMILY MEDICINE CLINIC | Age: 66
End: 2021-01-27

## 2021-01-27 DIAGNOSIS — M85.80 OSTEOPENIA, UNSPECIFIED LOCATION: ICD-10-CM

## 2021-01-27 DIAGNOSIS — Z00.00 ANNUAL PHYSICAL EXAM: Primary | ICD-10-CM

## 2021-01-29 ASSESSMENT — LIFESTYLE VARIABLES
HOW OFTEN DURING THE LAST YEAR HAVE YOU NEEDED AN ALCOHOLIC DRINK FIRST THING IN THE MORNING TO GET YOURSELF GOING AFTER A NIGHT OF HEAVY DRINKING: 0
HOW OFTEN DURING THE LAST YEAR HAVE YOU BEEN UNABLE TO REMEMBER WHAT HAPPENED THE NIGHT BEFORE BECAUSE YOU HAD BEEN DRINKING: NEVER
AUDIT TOTAL SCORE: 0
HOW OFTEN DURING THE LAST YEAR HAVE YOU HAD A FEELING OF GUILT OR REMORSE AFTER DRINKING: 0
HAVE YOU OR SOMEONE ELSE BEEN INJURED AS A RESULT OF YOUR DRINKING: NO
HOW OFTEN DO YOU HAVE A DRINK CONTAINING ALCOHOL: 1
HOW OFTEN DURING THE LAST YEAR HAVE YOU FOUND THAT YOU WERE NOT ABLE TO STOP DRINKING ONCE YOU HAD STARTED: NEVER
HOW OFTEN DO YOU HAVE A DRINK CONTAINING ALCOHOL: MONTHLY OR LESS
HOW OFTEN DURING THE LAST YEAR HAVE YOU HAD A FEELING OF GUILT OR REMORSE AFTER DRINKING: NEVER
AUDIT-C TOTAL SCORE: 0
HAS A RELATIVE, FRIEND, DOCTOR, OR ANOTHER HEALTH PROFESSIONAL EXPRESSED CONCERN ABOUT YOUR DRINKING OR SUGGESTED YOU CUT DOWN: NO
HOW OFTEN DURING THE LAST YEAR HAVE YOU FAILED TO DO WHAT WAS NORMALLY EXPECTED FROM YOU BECAUSE OF DRINKING: NEVER
HOW OFTEN DO YOU HAVE SIX OR MORE DRINKS ON ONE OCCASION: 0
AUDIT TOTAL SCORE: 1
HOW OFTEN DO YOU HAVE SIX OR MORE DRINKS ON ONE OCCASION: NEVER
HOW OFTEN DURING THE LAST YEAR HAVE YOU NEEDED AN ALCOHOLIC DRINK FIRST THING IN THE MORNING TO GET YOURSELF GOING AFTER A NIGHT OF HEAVY DRINKING: NEVER
HOW MANY STANDARD DRINKS CONTAINING ALCOHOL DO YOU HAVE ON A TYPICAL DAY: ONE OR TWO

## 2021-01-29 ASSESSMENT — PATIENT HEALTH QUESTIONNAIRE - PHQ9
SUM OF ALL RESPONSES TO PHQ9 QUESTIONS 1 & 2: 0
SUM OF ALL RESPONSES TO PHQ QUESTIONS 1-9: 0
SUM OF ALL RESPONSES TO PHQ QUESTIONS 1-9: 0
1. LITTLE INTEREST OR PLEASURE IN DOING THINGS: 0
2. FEELING DOWN, DEPRESSED OR HOPELESS: 0

## 2021-02-02 ENCOUNTER — NURSE ONLY (OUTPATIENT)
Dept: FAMILY MEDICINE CLINIC | Age: 66
End: 2021-02-02
Payer: MEDICARE

## 2021-02-02 DIAGNOSIS — Z00.00 ANNUAL PHYSICAL EXAM: ICD-10-CM

## 2021-02-02 DIAGNOSIS — M85.80 OSTEOPENIA, UNSPECIFIED LOCATION: ICD-10-CM

## 2021-02-02 LAB
A/G RATIO: 1.7 (ref 1.1–2.2)
ALBUMIN SERPL-MCNC: 4.7 G/DL (ref 3.4–5)
ALP BLD-CCNC: 74 U/L (ref 40–129)
ALT SERPL-CCNC: 10 U/L (ref 10–40)
ANION GAP SERPL CALCULATED.3IONS-SCNC: 15 MMOL/L (ref 3–16)
AST SERPL-CCNC: 17 U/L (ref 15–37)
BASOPHILS ABSOLUTE: 0 K/UL (ref 0–0.2)
BASOPHILS RELATIVE PERCENT: 1 %
BILIRUB SERPL-MCNC: 0.5 MG/DL (ref 0–1)
BUN BLDV-MCNC: 17 MG/DL (ref 7–20)
CALCIUM SERPL-MCNC: 10.3 MG/DL (ref 8.3–10.6)
CHLORIDE BLD-SCNC: 97 MMOL/L (ref 99–110)
CHOLESTEROL, TOTAL: 272 MG/DL (ref 0–199)
CO2: 25 MMOL/L (ref 21–32)
CREAT SERPL-MCNC: 0.7 MG/DL (ref 0.6–1.2)
EOSINOPHILS ABSOLUTE: 0.1 K/UL (ref 0–0.6)
EOSINOPHILS RELATIVE PERCENT: 1.6 %
GFR AFRICAN AMERICAN: >60
GFR NON-AFRICAN AMERICAN: >60
GLOBULIN: 2.7 G/DL
GLUCOSE BLD-MCNC: 86 MG/DL (ref 70–99)
HCT VFR BLD CALC: 42.2 % (ref 36–48)
HDLC SERPL-MCNC: 83 MG/DL (ref 40–60)
HEMOGLOBIN: 13.4 G/DL (ref 12–16)
LDL CHOLESTEROL CALCULATED: 179 MG/DL
LYMPHOCYTES ABSOLUTE: 1.4 K/UL (ref 1–5.1)
LYMPHOCYTES RELATIVE PERCENT: 39 %
MCH RBC QN AUTO: 28.1 PG (ref 26–34)
MCHC RBC AUTO-ENTMCNC: 31.8 G/DL (ref 31–36)
MCV RBC AUTO: 88.4 FL (ref 80–100)
MONOCYTES ABSOLUTE: 0.4 K/UL (ref 0–1.3)
MONOCYTES RELATIVE PERCENT: 11.7 %
NEUTROPHILS ABSOLUTE: 1.7 K/UL (ref 1.7–7.7)
NEUTROPHILS RELATIVE PERCENT: 46.7 %
PDW BLD-RTO: 13.2 % (ref 12.4–15.4)
PLATELET # BLD: 251 K/UL (ref 135–450)
PMV BLD AUTO: 10.1 FL (ref 5–10.5)
POTASSIUM SERPL-SCNC: 4.7 MMOL/L (ref 3.5–5.1)
RBC # BLD: 4.77 M/UL (ref 4–5.2)
SODIUM BLD-SCNC: 137 MMOL/L (ref 136–145)
TOTAL PROTEIN: 7.4 G/DL (ref 6.4–8.2)
TRIGL SERPL-MCNC: 51 MG/DL (ref 0–150)
VITAMIN D 25-HYDROXY: 66.2 NG/ML
VLDLC SERPL CALC-MCNC: 10 MG/DL
WBC # BLD: 3.6 K/UL (ref 4–11)

## 2021-02-02 PROCEDURE — 36415 COLL VENOUS BLD VENIPUNCTURE: CPT | Performed by: FAMILY MEDICINE

## 2021-02-08 ENCOUNTER — OFFICE VISIT (OUTPATIENT)
Dept: FAMILY MEDICINE CLINIC | Age: 66
End: 2021-02-08
Payer: MEDICARE

## 2021-02-08 VITALS
SYSTOLIC BLOOD PRESSURE: 118 MMHG | WEIGHT: 141 LBS | BODY MASS INDEX: 22.13 KG/M2 | HEIGHT: 67 IN | TEMPERATURE: 97.4 F | HEART RATE: 70 BPM | OXYGEN SATURATION: 98 % | DIASTOLIC BLOOD PRESSURE: 82 MMHG

## 2021-02-08 DIAGNOSIS — N95.1 MENOPAUSAL SYNDROME: ICD-10-CM

## 2021-02-08 DIAGNOSIS — M85.80 OSTEOPENIA, UNSPECIFIED LOCATION: ICD-10-CM

## 2021-02-08 DIAGNOSIS — I82.493 ACUTE DEEP VEIN THROMBOSIS (DVT) OF OTHER SPECIFIED VEIN OF BOTH LOWER EXTREMITIES (HCC): ICD-10-CM

## 2021-02-08 DIAGNOSIS — Z00.00 WELCOME TO MEDICARE PREVENTIVE VISIT: Primary | ICD-10-CM

## 2021-02-08 DIAGNOSIS — Z00.00 ROUTINE GENERAL MEDICAL EXAMINATION AT A HEALTH CARE FACILITY: ICD-10-CM

## 2021-02-08 DIAGNOSIS — Z12.31 ENCOUNTER FOR SCREENING MAMMOGRAM FOR MALIGNANT NEOPLASM OF BREAST: ICD-10-CM

## 2021-02-08 PROBLEM — I26.99 BILATERAL PULMONARY EMBOLISM (HCC): Status: RESOLVED | Noted: 2018-07-01 | Resolved: 2021-02-08

## 2021-02-08 PROBLEM — I26.99 PULMONARY EMBOLISM, BILATERAL (HCC): Status: RESOLVED | Noted: 2020-06-21 | Resolved: 2021-02-08

## 2021-02-08 PROCEDURE — G0403 EKG FOR INITIAL PREVENT EXAM: HCPCS | Performed by: FAMILY MEDICINE

## 2021-02-08 PROCEDURE — 3017F COLORECTAL CA SCREEN DOC REV: CPT | Performed by: FAMILY MEDICINE

## 2021-02-08 PROCEDURE — G0402 INITIAL PREVENTIVE EXAM: HCPCS | Performed by: FAMILY MEDICINE

## 2021-02-08 PROCEDURE — 4040F PNEUMOC VAC/ADMIN/RCVD: CPT | Performed by: FAMILY MEDICINE

## 2021-02-08 PROCEDURE — 1123F ACP DISCUSS/DSCN MKR DOCD: CPT | Performed by: FAMILY MEDICINE

## 2021-02-08 ASSESSMENT — VISUAL ACUITY
OS_CC: 20/25
OD_CC: 20/25

## 2021-02-08 NOTE — PATIENT INSTRUCTIONS
Patient should call the office immediately with new or ongoing signs or symptoms or worsening, or proceed to the emergency room. If you are on medications which could impair your senses, you are at risk of weakness, falls, dizziness, or drowsiness. You should be careful during activities which could place you at risk of harm, such as climbing, using stairs, operating machinery, or driving vehicles. If you feel you cannot safely do these activities, you should request others to help you, or avoid the activities altogether. If you are drowsy for any other reason, you should use the same precautions as listed above. Web Address for Advance Directive:    [employer]     Personalized Preventive Plan for Natalio Lovelace - 2/8/2021  Medicare offers a range of preventive health benefits. Some of the tests and screenings are paid in full while other may be subject to a deductible, co-insurance, and/or copay. Some of these benefits include a comprehensive review of your medical history including lifestyle, illnesses that may run in your family, and various assessments and screenings as appropriate. After reviewing your medical record and screening and assessments performed today your provider may have ordered immunizations, labs, imaging, and/or referrals for you. A list of these orders (if applicable) as well as your Preventive Care list are included within your After Visit Summary for your review. Other Preventive Recommendations:    · A preventive eye exam performed by an eye specialist is recommended every 1-2 years to screen for glaucoma; cataracts, macular degeneration, and other eye disorders. · A preventive dental visit is recommended every 6 months. · Try to get at least 150 minutes of exercise per week or 10,000 steps per day on a pedometer . · Order or download the FREE \"Exercise & Physical Activity: Your Everyday Guide\" from [employer] on [employer]. Call 4-982.238.4293 or search [employer] online. · You need 2825-5986 mg of calcium and 2476-7301 IU of vitamin D per day. It is possible to meet your calcium requirement with diet alone, but a vitamin D supplement is usually necessary to meet this goal.  · When exposed to the sun, use a sunscreen that protects against both UVA and UVB radiation with an SPF of 30 or greater. Reapply every 2 to 3 hours or after sweating, drying off with a towel, or swimming. · Always wear a seat belt when traveling in a car. Always wear a helmet when riding a bicycle or motorcycle.

## 2021-02-08 NOTE — PROGRESS NOTES
Medicare Annual Wellness Visit  Name: Francseca Mcghee Date: 2021   MRN: <[id number]> Sex: Female   Age: 72 y.o. Ethnicity: Non-/Non    : 1955 Race: Anthony Leonard is here for Medicare AWV    Screenings for behavioral, psychosocial and functional/safety risks, and cognitive dysfunction are all negative except as indicated below. These results, as well as other patient data from the [address] form, are documented in Flowsheets linked to this Encounter. Allergies   Allergen Reactions    Erythromycin Nausea Only         Prior to Visit Medications    Medication Sig Taking? Authorizing Provider   celecoxib (CELEBREX) 200 MG capsule TAKE ONE CAPSULE BY MOUTH TWO TIMES DAILY AS NEEDED  Patient taking differently: TAKE ONE CAPSULE BY MOUTH TWO TIMES DAILY Yes Mary Crespo MD   apixaban (ELIQUIS) 5 MG TABS tablet Take 1 tablet by mouth 2 times daily Yes Mary Crespo MD   famciclovir (FAMVIR) 500 MG tablet Take 2 tablets twice a day per fever blister outbreak Yes Mary Crespo MD   DiphenhydrAMINE HCl (BENADRYL ALLERGY PO) Take 25 mg by mouth nightly Yes Historical Provider, MD   Cholecalciferol (VITAMIN D) 2000 UNITS CAPS capsule Take 2,000 Units by mouth Yes Historical Provider, MD   Calcium Carbonate-Vitamin D (CALCIUM-VITAMIN D) 500-200 MG-UNIT per tablet Take 1 tablet by mouth 2 times daily (with meals). Yes Historical Provider, MD   Glucosamine-Chondroit-Vit C-Mn (GLUCOSAMINE 1500 COMPLEX) CAPS Take 2 tablets by mouth daily. Yes Historical Provider, MD   therapeutic multivitamin-minerals (THERAGRAN-M) tablet Take 1 tablet by mouth daily.    Yes Historical Provider, MD         Past Medical History:   Diagnosis Date    Allergic rhinitis     Osteoarthritis     Osteoporosis     Pulmonary emboli ([address] 2018       Past Surgical History:   Procedure Laterality Date    BUNIONECTOMY      COLONOSCOPY  2017    normal    murmurs, rubs, clicks, or gallops, distal pulses intact, no carotid bruits  Abdomen: soft, non-tender, non-distended, normal bowel sounds, no masses or organomegaly  Extremities: no cyanosis, clubbing or edema  Musculoskeletal: normal range of motion, no joint swelling, deformity or tenderness  Neurologic: reflexes normal and symmetric, no cranial nerve deficit, gait, coordination and speech normal    Patient's complete Health Risk Assessment and screening values have been reviewed and are found in Flowsheets. The following problems were reviewed today and where indicated follow up appointments were made and/or referrals ordered. Positive Risk Factor Screenings with Interventions:            General Health and ACP:  General  In general, how would you say your health is?: Very Good  In the past 7 days, have you experienced any of the following?  New or Increased Pain, New or Increased Fatigue, Loneliness, Social Isolation, Stress or Anger?: None of These  Do you get the social and emotional support that you need?: Yes  Do you have a Living Will?: Yes  Advance Directives     Power of  Living Will ACP-Advance Directive ACP-Power of     Not on File Not on File Not on File Not on File      General Health Risk Interventions:  · No issues      Safety:  Safety  Do you have working smoke detectors?: Yes  Have all throw rugs been removed or fastened?: (!) No  Do you have non-slip mats or surfaces in all bathtubs/showers?: (!) No  Do all of your stairways have a railing or banister?: Yes  Are your doorways, halls and stairs free of clutter?: Yes  Do you always fasten your seatbelt when you are in a car?: Yes  Safety Interventions:  · Patient declines any further evaluation/treatment for this issue     Personalized Preventive Plan   Current Health Maintenance Status  Immunization History   Administered Date(s) Administered    Influenza 10/01/2015    Influenza Vaccine, unspecified formulation 10/15/2016    Influenza Virus Vaccine 10/11/2017, 10/15/2018, 10/11/2019    Influenza, Quadv, IM, PF (6 mo and older Fluzone, Flulaval, Fluarix, and 3 yrs and older Afluria) 10/19/2020    Pneumococcal Polysaccharide ([id number]) 09/14/2020    Td (Adult), 2 Lf Tetanus Toxoid, Pf (Td, Absorbed) 02/26/2019    Tdap (Boostrix, Adacel) 12/01/2008    Zoster Live (Zostavax) 01/12/2016    Zoster Recombinant (Shingrix) 08/14/2018, 11/30/2018        Health Maintenance   Topic Date Due    Annual Wellness Visit (AWV)  09/03/2020    COVID-19 Vaccine (2 of 2 - Moderna series) 02/10/2021    Colon cancer screen colonoscopy  01/26/2022    Breast cancer screen  06/25/2022    Lipid screen  02/02/2026    DTaP/Tdap/Td vaccine (3 - Td) 02/26/2029    DEXA (modify frequency per FRAX score)  Completed    Flu vaccine  Completed    Shingles Vaccine  Completed    Pneumococcal 65+ years Vaccine  Completed    Hepatitis C screen  Completed    HIV screen  Completed    Hepatitis A vaccine  Aged Out    Hepatitis B vaccine  Aged Out    Hib vaccine  Aged Out    Meningococcal (ACWY) vaccine  Aged Out     Recommendations for Archy Due: see orders and patient instructions/AVS.  . Recommended screening schedule for the next 5-10 years is provided to the patient in written form: see Patient Instructions/AVS.    Pepper Mensah was seen today for medicare awv. Diagnoses and all orders for this visit:    Welcome to Medicare preventive visit  -     EKG 12 Lead    Encounter for screening mammogram for malignant neoplasm of breast  -     GLORIA DIGITAL SCREEN W OR WO CAD BILATERAL; Future    Osteopenia, unspecified location    Acute deep vein thrombosis (DVT) of other specified vein of both lower extremities (HCC)    Menopausal syndrome  -     DEXA BONE DENSITY AXIAL SKELETON; Future    Routine general medical examination at a health care facility         She will update her DEXA scan. No further issues with DVT or pulmonary emboli.   She is on anticoagulant for life. Does have about 50 to 60% wax in the right ear hearing was down in that ear, she will irrigate the ear. Otherwise follow-up 1 year. Patient should call the office immediately with new or ongoing signs or symptoms or worsening, or proceed to the emergency room. No changes in past medical history, past surgical history, social history, or family history were noted during the patient encounter unless specifically listed above. All updates of past medical history, past surgical history, social history, or family history were reviewed personally by me during the office visit. All problems listed in the assessment are stable unless noted otherwise. Medication profile reviewed personally by me during the visit. Medication side effects and possible impairments from medications were discussed as applicable. This document was prepared by a combination of typing and transcription through a voice recognition software.

## 2021-03-10 PROBLEM — Z00.00 WELCOME TO MEDICARE PREVENTIVE VISIT: Status: RESOLVED | Noted: 2021-02-08 | Resolved: 2021-03-10

## 2021-03-16 ENCOUNTER — OFFICE VISIT (OUTPATIENT)
Dept: OBGYN CLINIC | Age: 66
End: 2021-03-16
Payer: MEDICARE

## 2021-03-16 VITALS
WEIGHT: 139.6 LBS | BODY MASS INDEX: 22.19 KG/M2 | DIASTOLIC BLOOD PRESSURE: 76 MMHG | TEMPERATURE: 96.8 F | HEART RATE: 72 BPM | SYSTOLIC BLOOD PRESSURE: 128 MMHG

## 2021-03-16 DIAGNOSIS — Z90.710 HISTORY OF TOTAL VAGINAL HYSTERECTOMY (TVH): ICD-10-CM

## 2021-03-16 DIAGNOSIS — N95.2 VAGINAL ATROPHY: ICD-10-CM

## 2021-03-16 DIAGNOSIS — Z53.8 PAP SMEAR OF CERVIX NOT NEEDED: ICD-10-CM

## 2021-03-16 DIAGNOSIS — Z86.711 HISTORY OF PULMONARY EMBOLISM: ICD-10-CM

## 2021-03-16 DIAGNOSIS — Z01.419 WOMEN'S ANNUAL ROUTINE GYNECOLOGICAL EXAMINATION: Primary | ICD-10-CM

## 2021-03-16 DIAGNOSIS — Z78.0 MENOPAUSE: ICD-10-CM

## 2021-03-16 PROCEDURE — G8427 DOCREV CUR MEDS BY ELIG CLIN: HCPCS | Performed by: OBSTETRICS & GYNECOLOGY

## 2021-03-16 PROCEDURE — G0101 CA SCREEN;PELVIC/BREAST EXAM: HCPCS | Performed by: OBSTETRICS & GYNECOLOGY

## 2021-03-16 PROCEDURE — G8420 CALC BMI NORM PARAMETERS: HCPCS | Performed by: OBSTETRICS & GYNECOLOGY

## 2021-03-16 ASSESSMENT — ENCOUNTER SYMPTOMS
SHORTNESS OF BREATH: 0
ABDOMINAL PAIN: 0
NAUSEA: 0
ABDOMINAL DISTENTION: 0
CONSTIPATION: 0
DIARRHEA: 0
VOMITING: 0

## 2021-03-16 NOTE — PROGRESS NOTES
Constitutional:       General: She is not in acute distress. Appearance: Normal appearance. She is well-developed. She is not ill-appearing or toxic-appearing. HENT:      Head: Normocephalic and atraumatic. Neck:      Musculoskeletal: Neck supple. Thyroid: No thyromegaly. Trachea: Trachea normal.   Cardiovascular:      Rate and Rhythm: Normal rate and regular rhythm. Heart sounds: Normal heart sounds, S1 normal and S2 normal.   Pulmonary:      Effort: Pulmonary effort is normal. No respiratory distress. Breath sounds: Normal breath sounds. Chest:      Breasts: Breasts are symmetrical.         Right: No inverted nipple, mass, nipple discharge, skin change or tenderness. Left: No inverted nipple, mass, nipple discharge, skin change or tenderness. Abdominal:      General: Bowel sounds are normal. There is no distension. Palpations: Abdomen is soft. Abdomen is not rigid. There is no mass. Tenderness: There is no abdominal tenderness. There is no guarding or rebound. Genitourinary:     General: Normal vulva. Exam position: Lithotomy position. Labia:         Right: No rash, tenderness, lesion or injury. Left: No rash, tenderness, lesion or injury. Vagina: No signs of injury. No vaginal discharge, erythema, tenderness or bleeding. Uterus: Absent. Adnexa:         Right: No mass, tenderness or fullness. Left: No mass, tenderness or fullness. Rectum: Normal. Guaiac result negative. Musculoskeletal: Normal range of motion. Skin:     General: Skin is warm and dry. Findings: No erythema or rash. Nails: There is no clubbing. Neurological:      Mental Status: She is alert and oriented to person, place, and time. Psychiatric:         Speech: Speech normal.         Behavior: Behavior normal. Behavior is cooperative. Thought Content:  Thought content normal.         Judgment: Judgment normal. Assessment:       Diagnosis Orders   1. Women's annual routine gynecological examination     2. Pap smear of cervix not needed     3. History of total vaginal hysterectomy (TVH)     4. History of pulmonary embolism     5. Menopause     6. Vaginal atrophy             Plan:      Await mammogram  Await dexa scan  Follow up prn and 1-3 years for well woman examination.            Brianna Kunz,

## 2021-06-28 ENCOUNTER — HOSPITAL ENCOUNTER (OUTPATIENT)
Dept: GENERAL RADIOLOGY | Age: 66
Discharge: HOME | End: 2021-06-28
Payer: MEDICARE

## 2021-06-28 ENCOUNTER — HOSPITAL ENCOUNTER (OUTPATIENT)
Dept: MAMMOGRAPHY | Age: 66
Discharge: HOME | End: 2021-06-28
Payer: MEDICARE

## 2021-06-28 DIAGNOSIS — Z12.31 ENCOUNTER FOR SCREENING MAMMOGRAM FOR MALIGNANT NEOPLASM OF BREAST: ICD-10-CM

## 2021-06-28 DIAGNOSIS — N95.1 MENOPAUSAL SYNDROME: ICD-10-CM

## 2021-06-28 PROCEDURE — 77080 DXA BONE DENSITY AXIAL: CPT

## 2021-06-28 PROCEDURE — 77063 BREAST TOMOSYNTHESIS BI: CPT

## 2021-06-29 ENCOUNTER — TELEPHONE (OUTPATIENT)
Dept: MAMMOGRAPHY | Age: 66
End: 2021-06-29

## 2021-09-08 ENCOUNTER — HOSPITAL ENCOUNTER (OUTPATIENT)
Age: 66
End: 2021-09-08
Payer: MEDICARE

## 2021-09-08 ENCOUNTER — HOSPITAL ENCOUNTER (OUTPATIENT)
Dept: GENERAL RADIOLOGY | Age: 66
Discharge: HOME | End: 2021-09-08
Payer: MEDICARE

## 2021-09-08 ENCOUNTER — TELEPHONE (OUTPATIENT)
Dept: FAMILY MEDICINE CLINIC | Age: 66
End: 2021-09-08

## 2021-09-08 DIAGNOSIS — M79.632 LEFT FOREARM PAIN: Primary | ICD-10-CM

## 2021-09-08 DIAGNOSIS — M79.632 LEFT FOREARM PAIN: ICD-10-CM

## 2021-09-08 PROCEDURE — 73090 X-RAY EXAM OF FOREARM: CPT

## 2021-09-09 NOTE — TELEPHONE ENCOUNTER
Patient called this on call provider stating she fell on her left arm when playing soccer with her grandchildren. She is worried she broke her arm. She is having pain in her left forearm. X-ray ordered.

## 2021-10-11 ENCOUNTER — PATIENT MESSAGE (OUTPATIENT)
Dept: OBGYN CLINIC | Age: 66
End: 2021-10-11

## 2021-10-12 RX ORDER — IBANDRONATE SODIUM 150 MG/1
150 TABLET, FILM COATED ORAL
Qty: 3 TABLET | Refills: 0 | Status: SHIPPED | OUTPATIENT
Start: 2021-10-12 | End: 2021-11-29 | Stop reason: SDUPTHER

## 2021-10-12 NOTE — TELEPHONE ENCOUNTER
Rx signed. In light of the recent fracture, would recommend repeat DEXA 1 year after starting medication. Thanks.

## 2021-11-17 RX ORDER — AMOXICILLIN 875 MG/1
875 TABLET, COATED ORAL 2 TIMES DAILY
Qty: 20 TABLET | Refills: 0 | Status: SHIPPED | OUTPATIENT
Start: 2021-11-17 | End: 2021-11-27

## 2021-11-29 DIAGNOSIS — M19.90 ARTHRITIS: ICD-10-CM

## 2021-11-29 RX ORDER — PSEUDOEPHEDRINE HCL 60 MG/1
60 TABLET ORAL 2 TIMES DAILY
Qty: 180 TABLET | Refills: 3 | Status: SHIPPED | OUTPATIENT
Start: 2021-11-29

## 2021-11-29 RX ORDER — IBANDRONATE SODIUM 150 MG/1
150 TABLET, FILM COATED ORAL
Qty: 3 TABLET | Refills: 0 | Status: SHIPPED | OUTPATIENT
Start: 2021-11-29 | End: 2022-02-16 | Stop reason: SDUPTHER

## 2021-11-29 RX ORDER — CELECOXIB 200 MG/1
CAPSULE ORAL
Qty: 180 CAPSULE | Refills: 3 | Status: SHIPPED | OUTPATIENT
Start: 2021-11-29

## 2022-01-14 ENCOUNTER — HOSPITAL ENCOUNTER (OUTPATIENT)
Dept: MRI IMAGING | Age: 67
Discharge: HOME | End: 2022-01-14
Payer: MEDICARE

## 2022-01-14 DIAGNOSIS — M75.42 SHOULDER IMPINGEMENT SYNDROME, LEFT: ICD-10-CM

## 2022-01-14 DIAGNOSIS — M25.512 LEFT SHOULDER PAIN, UNSPECIFIED CHRONICITY: ICD-10-CM

## 2022-01-14 PROCEDURE — 73221 MRI JOINT UPR EXTREM W/O DYE: CPT

## 2022-01-26 ENCOUNTER — HOSPITAL ENCOUNTER (OUTPATIENT)
Dept: PHYSICAL THERAPY | Age: 67
Setting detail: THERAPIES SERIES
Discharge: HOME | End: 2022-01-26
Payer: MEDICARE

## 2022-01-26 PROCEDURE — 97140 MANUAL THERAPY 1/> REGIONS: CPT

## 2022-01-26 PROCEDURE — 97161 PT EVAL LOW COMPLEX 20 MIN: CPT

## 2022-01-26 PROCEDURE — 97110 THERAPEUTIC EXERCISES: CPT

## 2022-01-26 NOTE — FLOWSHEET NOTE
[facility],  [place] [address] [place], [address]  Phone: [phone], [phone]    Physical Therapy Treatment Note/ Progress Report:     Date:  2022    Patient Name:  Paxton Humphrey    :  1955  MRN: [id number]  Restrictions/Precautions:    Medical/Treatment Diagnosis Information:  · Diagnosis: L incomplete RTC tear and L impingement syndrome  · Treatment Diagnosis: S46.012D and X41.28  Insurance/Certification information:  PT Insurance Information: Medicare  Physician Information:  Referring Practitioner: Clary Mccall  Has the plan of care been signed (Y/N):        []  Yes  [x]  No     Date of Patient follow up with Physician: 2/15/2022    Is this a Progress Report:     []  Yes  [x]  No      If Yes:  Date Range for reporting period:  Initial Eval: 2022  Beginnin2022 --- Endin2022     Progress report will be due (10 Rx or 30 days whichever is less): 3/56/4313     Recertification will be due (POC Duration  / 90 days whichever is less): 2022       Visit # Insurance Allowable Auth Required   In Person 1 Med Nec []  Yes     []  No    Tele Health   []  Yes     []  No    Total 1         Functional Scale: Quick Dash: 36% (Total Number Sum: )   Date assessed: 2022     Latex Allergy:  [x]NO      []YES  Preferred Language for Healthcare:   [x]English       []other:    Pain level:  currently 0/10; worst 5/10    SUBJECTIVE:  See eval    OBJECTIVE: See eval   Observation:    Test measurements:      RESTRICTIONS/PRECAUTIONS: Osteopenia    Exercises/Interventions:   Therapeutic Ex (04059)  Therapeutic Activity (19201)  NMR re-education (89634) Sets/Reps Notes/CUES   [place] NV          Scap squeezes 20 x    Shoulder shrugs 20 x     UT stretch 30\" x 3    Posterior capsule stretch 30\" x 3    Bilateral ER 5\" x 20         Supine Cane ER 5\" x 20    Supine Cane flexion 5\" x 20         Prone row 20x 3#   Prone I and Y ((T NV)) 20x ea         Wall walks 20x                                                 Manual Intervention (29441)     Shoulder PROM- all directions 5'    Shoulder mobilizations- post/inf, post 5'                        Medbridge access code: [email or web address]  Access Code: [email or web address]  URL: [email or web address]  Date: 01/26/2022  Prepared by: Sergio Eldridge    Exercises  Supine Shoulder External Rotation with Dowel - 2 x daily - 7 x weekly - 3 sets - 10 reps - 5 s hold  Supine Shoulder Flexion with Dowel - 2 x daily - 7 x weekly - 3 sets - 10 reps - 5 s hold  Shoulder Flexion Wall Slide with Towel - 2 x daily - 7 x weekly - 3 sets - 10 reps - 5 s hold  Shoulder External Rotation - 2 x daily - 7 x weekly - 3 sets - 10 reps - 5 s hold  Seated Scapular Retraction - 2 x daily - 7 x weekly - 3 sets - 10 reps  Standing Shoulder Shrugs - 2 x daily - 7 x weekly - 3 sets - 10 reps  Seated Neck Sidebending Stretch - 2 x daily - 7 x weekly - 3 sets - 30\" hold  Standing Shoulder Posterior Capsule Stretch - 2 x daily - 7 x weekly - 3 sets - 30 s hold  Prone Shoulder Row - 1 x daily - 7 x weekly - 3 sets - 10 reps  Prone Single Arm Shoulder Y - 1 x daily - 7 x weekly - 3 sets - 10 reps  Prone Shoulder Extension - Single Arm - 1 x daily - 7 x weekly - 3 sets - 10 reps                  Patient Education 10' Pt education with HEP and progression of PT along with compliance with HEP to aide with formal PT for optimal outcomes. Therapeutic Exercise and NMR EXR  [x] (01398) Provided verbal/tactile cueing for activities related to strengthening, flexibility, endurance, ROM  for improvements in scapular, scapulothoracic and UE control with self care, reaching, carrying, lifting, house/yardwork, driving/computer work.     [x] (28409) Provided verbal/tactile cueing for activities related to improving balance, coordination, kinesthetic sense, posture, motor skill, proprioception  to assist with  scapular, scapulothoracic and UE control with self care, reaching, carrying, lifting, house/yardwork, driving/computer work. Therapeutic Activities:    [x] (11489 or 45541) Provided verbal/tactile cueing for activities related to improving balance, coordination, kinesthetic sense, posture, motor skill, proprioception and motor activation to allow for proper function of scapular, scapulothoracic and UE control with self care, carrying, lifting, driving/computer work.      Home Exercise Program:    [x] (93965) Reviewed/Progressed HEP activities related to strengthening, flexibility, endurance, ROM of scapular, scapulothoracic and UE control with self care, reaching, carrying, lifting, house/yardwork, driving/computer work  [x] (05313) Reviewed/Progressed HEP activities related to improving balance, coordination, kinesthetic sense, posture, motor skill, proprioception of scapular, scapulothoracic and UE control with self care, reaching, carrying, lifting, house/yardwork, driving/computer work      Manual Treatments:  PROM / STM / Oscillations-Mobs:  G-I, II, III, IV (PA's, Inf., Post.)  [x] (89268) Provided manual therapy to mobilize soft tissue/joints of cervical/CT, scapular GHJ and UE for the purpose of modulating pain, promoting relaxation,  increasing ROM, reducing/eliminating soft tissue swelling/inflammation/restriction, improving soft tissue extensibility and allowing for proper ROM for normal function with self care, reaching, carrying, lifting, house/yardwork, driving/computer work    Modalities:      Charges:  Timed Code Treatment Minutes: 35'   Total Treatment Minutes:  55'   BWC:  TE TIME:  NMR TIME:  MANUAL TIME:  UNTIMED MINUTES:  Medicare Total:         V# 1  $-100      [x] EVAL (LOW) 80797 (typically 20 minutes face-to-face) x 1  [] EVAL (MOD) 63082 (typically 30 minutes face-to-face)  [] EVAL (HIGH) 14763 (typically 45 minutes face-to-face)  [] RE-EVAL     [x] ZI(61410) x 1    [] IONTO  [] NMR (96213)     [] VASO  [x] Manual (70955) x 1     [] Other:  [] TA x      [] [facility] Traction (94894)  [] ES(attended) (76273)     [] ES (un) (79405):    ASSESSMENT:  See eval    GOALS:   Short Term Goals: To be achieved in: 2 weeks  1. Independent in HEP and progression per patient tolerance, in order to prevent re-injury. [] Progressing: [] Met: [] Not Met: [] Adjusted   2. Patient will have a decrease in pain to facilitate improvement in movement, function, and ADLs as indicated by Functional Deficits. [] Progressing: [] Met: [] Not Met: [] Adjusted     Long Term Goals: To be achieved in: 12 weeks  1. Disability index score of 20% or less for the QuickDash/Oswestry to assist with reaching prior level of function. [] Progressing: [] Met: [] Not Met: [] Adjusted   2. Patient will demonstrate increased left AROM to equal the opposite side bilaterally to allow for ability to reach overhead without pain or restriction as indicated by patients Functional Deficits. [] Progressing: [] Met: [] Not Met: [] Adjusted   3. Patient will demonstrate an increase in strength in left UE to 4+/5 to allow for ability to lift objects overhead as indicated by patients Functional Deficits. [] Progressing: [] Met: [] Not Met: [] Adjusted   4. Patient will return to all transfers, work activities, and functional activities without increased symptoms or restriction. [] Progressing: [] Met: [] Not Met: [] Adjusted   5. Patient will have 0/10 pain with ADL's.  [] Progressing: [] Met: [] Not Met: [] Adjusted   6. Patient stated goal: Return to working out pain and restriction free. [] Progressing: [] Met: [] Not Met: [] Adjusted    Overall Progression Towards Functional goals/ Treatment Progress Update:  [] Patient is progressing as expected towards functional goals listed. [] Progression is slowed due to complexities/Impairments listed. [] Progression has been slowed due to co-morbidities.   [x] Plan just implemented, too soon to assess goals progression <30days   [] Goals require adjustment due to lack of progress  [] Patient is not progressing as expected and requires additional follow up with physician  [] Other    Prognosis for POC: [x] Good [] Fair  [] Poor    Patient requires continued skilled intervention: [x] Yes  [] No    Treatment/Activity Tolerance:  [x] Patient able to complete treatment  [] Patient limited by fatigue  [] Patient limited by pain    [] Patient limited by other medical complications  [] Other:     Return to Play: (if applicable)   []  Stage 1: Intro to Strength   []  Stage 2: Return to Run and Strength   []  Stage 3: Return to Jump and Strength   []  Stage 4: Dynamic Strength and Agility   []  Stage 5: Sport Specific Training     []  Ready to Return to Play, Meets All Above Stages   []  Not Ready for Return to Sports   Comments:                         PLAN: See eval  [] Continue per plan of care [] Alter current plan (see comments above)  [x] Plan of care initiated [] Hold pending MD visit [] Discharge    Electronically signed by:  Cosme Banerjee, PT, MPT, ATC, Cert DN    Note: If patient does not return for scheduled/ recommended follow up visits, this note will serve as a discharge from care along with most recent update on progress.

## 2022-01-26 NOTE — PLAN OF CARE
[address] [place] [address], [address] [place] [address] [address], [address]  Phone: [phone], [phone]                                                    Physical Therapy Certification    Dear Referring Practitioner: Al Crouch,    We had the pleasure of evaluating the following patient for physical therapy services at [address] [place] [address]. A summary of our findings can be found in the initial assessment below. This includes our plan of care. If you have any questions or concerns regarding these findings, please do not hesitate to contact me at the office phone number checked above. Thank you for the referral.       Physician Signature:_______________________________Date:__________________  By signing above (or electronic signature), therapists plan is approved by physician      Patient: Rossana Lipscomb   : 1955   MRN: [id number]  Referring Physician: Referring Practitioner: Al Crouch      Evaluation Date: 2022      Medical Diagnosis Information:  Diagnosis: L incomplete RTC tear and L impingement syndrome   Treatment Diagnosis: S46.012D and M75.42                                         Insurance information: PT Insurance Information: Medicare    Precautions/ Contra-indications/Relevant Medical History: Osteopenia    C-SSRS Triggered by Intake questionnaire (Past 2 wk assessment):   [x] No, Questionnaire did not trigger screening.   [] Yes, Patient intake triggered further evaluation      [] C-SSRS Screening completed  [] PCP notified via Plan of Care  [] Emergency services notified     Latex Allergy:  [x]NO      []YES   Preferred Language for Healthcare:   [x]English       []other:    SUBJECTIVE: Patient stated complaint: Patient is a 78 y/o female who presents to therapy clinic for initial evaluation secondary to a Left incomplete RTC tear and subsequent L impingement syndrome.  Patient reports injury is secondary to fall. Patient is hoping for successful conservative treatment with aims of not having to have surgery. Patient reports main limitation is pain. Functional Disability Index: Quick Dash/Modified Oswestry: 36% (Total Number Sum: 27/55)    Pain Scale: currently 0/10; worst 5/10  Easing factors: heat, rest  Provocative factors: activity     Type: []Constant   [x]Intermittent  []Radiating []Localized []other:     Numbness/Tingling: None reported    Functional Limitations/Impairments: [x]Lifting/reaching [x]Grooming [x]Carrying    [x]ADL's []Driving [x]Sports/Recreations   []Other:    Occupation/School: Retired RN    Living Status/Prior Level of Function: Independent with ADLs, IADLs, and all other functional activities. OBJECTIVE:     CERV ROM     Cervical Flexion WFL    Cervical Extension WFL    Cervical SB WFL    Cervical rotation WFL         ROM Left Right   Shoulder Flex 145° painful 170°    Shoulder Abd 150° painful 180°   Shoulder ER 70° 69°   Shoulder IR 90° 90°   Elbow Flex     Elbow Ext     Wrist Flex     Wrist Ext     Strength  Left Right   Shoulder Flex 4-/5 painful 4/5   Shoulder Scap 4-/5 4/5   Shoulder ER 4-/5 painful 4-/5   Shoulder IR 4-/5 4/5   Elbow Flex 5/5 5/5   Elbow Ext     Wrist Flex     Wrist Ext     Shahid        Reflexes/Sensation (myotomes/dermatomes):    [x]Normal    []Abnormal:      Joint mobility:    [x]Normal    []Hypo   []Hyper     Palpation: Tender to palpation along supraspinatus tendon and over [facility] joint    Functional Mobility/Transfers: N/A    Posture: Forward head, rounded shoulders, and slight trunk flexed posturing    Bandages/Dressings/Incisions: N/A    Gait (include devices/WB status): Decreased arm swing on L    Orthopedic Special Tests:   Negative for pain with AC joint compression. Positive for pain with Horizontal abduction test.  3+/5- mid trap and rhomboids bilateral     [x] Patient history, allergies, meds reviewed. Medical chart reviewed. See intake form. Review Of Systems (ROS):  [x]Performed Review of systems (Integumentary, CardioPulmonary, Neurological) by intake and observation. Intake form has been scanned into medical record. Patient has been instructed to contact their primary care physician regarding ROS issues if not already being addressed at this time. Co-morbidities/Complexities (which will affect course of rehabilitation):   []None           Arthritic conditions   []Rheumatoid arthritis (M05.9)  []Osteoarthritis (M19.91)   Cardiovascular conditions   []Hypertension (I10)  []Hyperlipidemia (E78.5)  []Angina pectoris (I20)  []Atherosclerosis (I70)   Musculoskeletal conditions   []Disc pathology   []Congenital spine pathologies   []Prior surgical intervention  []Osteoporosis (M81.8)  [x]Osteopenia (M85.8)   Endocrine conditions   []Hypothyroid (E03.9)  []Hyperthyroid Gastrointestinal conditions   []Constipation (G71.75)   Metabolic conditions   []Morbid obesity (E66.01)  []Diabetes type 1(E10.65) or 2 (E11.65)   []Neuropathy (G60.9)     Pulmonary conditions   []Asthma (J45)  []Coughing   []COPD (J44.9)   Psychological Disorders  []Anxiety (F41.9)  []Depression (F32.9)   []Other:   []Other:          Barriers to/and or personal factors that will affect rehab potential:              []Age  []Sex              []Motivation/Lack of Motivation                        [x]Co-Morbidities              []Cognitive Function, education/learning barriers              []Environmental, home barriers              []profession/work barriers  []past PT/medical experience  []other:  Justification:     Falls Risk Assessment (30 days):   [x] Falls Risk assessed and no intervention required.   [] Falls Risk assessed and Patient requires intervention due to being higher risk   TUG score (>12s at risk):     [] Falls education provided, including       ASSESSMENT:   Functional Impairments   []Noted spinal or UE joint hypomobility   []Noted spinal or UE joint hypermobility   [x]Decreased UE functional ROM   [x]Decreased UE functional strength   []Abnormal reflexes/sensation/myotomal/dermatomal deficits   [x]Decreased RC/scapular/core strength and neuromuscular control   []other:      Functional Activity Limitations (from functional questionnaire and intake)   []Reduced ability to tolerate prolonged functional positions   []Reduced ability or difficulty with changes of positions or transfers between positions   [x]Reduced ability to maintain good posture and demonstrate good body mechanics with sitting, bending, and lifting   [x] Reduced ability or tolerance with driving and/or computer work   [x]Reduced ability to sleep   [x]Reduced ability to perform lifting, reaching, carrying tasks   [x]Reduced ability to tolerate impact through UE   [x]Reduced ability to reach behind back   [x]Reduced ability to  or hold objects   [x]Reduced ability to throw or toss an object   []other:    Participation Restrictions   [x]Reduced participation in self care activities   [x]Reduced participation in home management activities   [x]Reduced participation in work activities   [x]Reduced participation in social activities. [x]Reduced participation in sport/recreation activities. Classification:   []Signs/symptoms consistent with post-surgical status including decreased ROM, strength and function.   []Signs/symptoms consistent with joint sprain/strain   [x]Signs/symptoms consistent with shoulder impingement   []Signs/symptoms consistent with shoulder/elbow/wrist tendinopathy   [x]Signs/symptoms consistent with Rotator cuff tear   []Signs/symptoms consistent with labral tear   []Signs/symptoms consistent with postural dysfunction    []Signs/symptoms consistent with Glenohumeral IR Deficit - <45 degrees   []Signs/symptoms consistent with facet dysfunction of cervical/thoracic spine    []Signs/symptoms consistent with pathology which may benefit from Dry needling     []other: Tolerance of evaluation/treatment:    []Excellent   [x]Good    []Fair   []Poor    Physical Therapy Evaluation Complexity Justification   [x] A history of present problem with:  [] no personal factors and/or comorbidities that impact the plan of care;  [x]1-2 personal factors and/or comorbidities that impact the plan of care  []3 personal factors and/or comorbidities that impact the plan of care  [x] An examination of body systems using standardized tests and measures addressing any of the following: body structures and functions (impairments), activity limitations, and/or participation restrictions;:  [] a total of 1-2 or more elements   [x] a total of 3 or more elements   [] a total of 4 or more elements   [x] A clinical presentation with:  [x] stable and/or uncomplicated characteristics   [] evolving clinical presentation with changing characteristics  [] unstable and unpredictable characteristics;   [x] Clinical decision making of [x] low, [] moderate, [] high complexity using standardized patient assessment instrument and/or measurable assessment of functional outcome. [x] EVAL (LOW) 38669 (typically 20 minutes face-to-face)  [] EVAL (MOD) 79041 (typically 30 minutes face-to-face)  [] EVAL (HIGH) 02025 (typically 45 minutes face-to-face)  [] RE-EVAL     PLAN:  Frequency/Duration: 1-2 days per week for 12 weeks:  INTERVENTIONS:  [x]  Therapeutic exercise including: strength training, ROM, for upper extremity and core   [x]  NMR activation and proprioception for UE and Core   [x]  Manual therapy as indicated for UE and spine to include: Dry Needling/IASTM, STM, PROM, Gr I-IV mobilizations, manipulation. [x] Modalities as needed that may include: thermal agents, E-stim, Biofeedback, US, iontophoresis as indicated  [x] Patient education on joint protection, postural re-education, activity modification, progression of HEP.     HEP instruction: Refer to [address] [place] [address] access code and exercises on the 1st visit treatment note    GOALS:    Short Term Goals: To be achieved in: 2 weeks  1. Independent in HEP and progression per patient tolerance, in order to prevent re-injury. [] Progressing: [] Met: [] Not Met: [] Adjusted   2. Patient will have a decrease in pain to facilitate improvement in movement, function, and ADLs as indicated by Functional Deficits. [] Progressing: [] Met: [] Not Met: [] Adjusted     Long Term Goals: To be achieved in: 12 weeks  1. Disability index score of 20% or less for the QuickDash/Oswestry to assist with reaching prior level of function. [] Progressing: [] Met: [] Not Met: [] Adjusted   2. Patient will demonstrate increased left AROM to equal the opposite side bilaterally to allow for ability to reach overhead without pain or restriction as indicated by patients Functional Deficits. [] Progressing: [] Met: [] Not Met: [] Adjusted   3. Patient will demonstrate an increase in strength in left UE to 4+/5 to allow for ability to lift objects overhead as indicated by patients Functional Deficits. [] Progressing: [] Met: [] Not Met: [] Adjusted   4. Patient will return to all transfers, work activities, and functional activities without increased symptoms or restriction. [] Progressing: [] Met: [] Not Met: [] Adjusted   5. Patient will have 0/10 pain with ADL's.  [] Progressing: [] Met: [] Not Met: [] Adjusted   6. Patient stated goal: Return to working out pain and restriction free.    [] Progressing: [] Met: [] Not Met: [] Adjusted      Electronically signed by:  Kelsey Gudino, PT, MPT, ATC, Debbie

## 2022-01-31 ENCOUNTER — HOSPITAL ENCOUNTER (OUTPATIENT)
Dept: PHYSICAL THERAPY | Age: 67
Setting detail: THERAPIES SERIES
Discharge: HOME | End: 2022-01-31
Payer: MEDICARE

## 2022-01-31 PROCEDURE — 97110 THERAPEUTIC EXERCISES: CPT | Performed by: PHYSICAL THERAPY ASSISTANT

## 2022-01-31 PROCEDURE — 97140 MANUAL THERAPY 1/> REGIONS: CPT | Performed by: PHYSICAL THERAPY ASSISTANT

## 2022-01-31 NOTE — FLOWSHEET NOTE
[facility], [address] [facility] [place], [address]  Phone: [phone], [phone]    Physical Therapy Treatment Note/ Progress Report:     Date:  2022    Patient Name:  Shari Pastrana    :  1955  MRN: [id number]  Restrictions/Precautions:    Medical/Treatment Diagnosis Information:  · Diagnosis: L incomplete RTC tear and L impingement syndrome  · Treatment Diagnosis: S46.012D and S64.05  Insurance/Certification information:  PT Insurance Information: Medicare  Physician Information:  Referring Practitioner: Aleida Evangelista  Has the plan of care been signed (Y/N):        []  Yes  [x]  No     Date of Patient follow up with Physician: 2/15/2022    Is this a Progress Report:     []  Yes  [x]  No      If Yes:  Date Range for reporting period:  Initial Eval: 2022  Beginnin2022 --- Endin2022     Progress report will be due (10 Rx or 30 days whichever is less): 3751     Recertification will be due (POC Duration  / 90 days whichever is less): 2022       Visit # Insurance Allowable Auth Required   In Person 2 Med Nec []  Yes     []  No    Tele Health   []  Yes     []  No    Total 2         Functional Scale: Quick Dash: 36% (Total Number Sum: 2755)   Date assessed: 2022     Latex Allergy:  [x]NO      []YES  Preferred Language for Healthcare:   [x]English       []other:    Pain level:  currently 0/10; worst 5/10    SUBJECTIVE:  Doing OK. Gets sore with exercises but settles when she is not pushing it.    OBJECTIVE: See eval   Observation:    Test measurements:      RESTRICTIONS/PRECAUTIONS: Osteopenia    Exercises/Interventions:   Therapeutic Ex (76777)  Therapeutic Activity (06344)  NMR re-education (44264) Sets/Reps Notes/CUES   Pulley NV 5'         T-band Row / Extension  Red x30 / Red x20    T-Band IR / ER  Red x20 / Red x20    Wall push up  x20              UT stretch 30\" x 3    Posterior capsule stretch 30\" x 3    Wall flexion stretch with ecc lower 5\" x20     Doorway ER stretch  10x10\"         Bilateral ER 5\" x 20         Supine Cane ER 5\" x 20    Supine Cane flexion 5\" x 20         Prone row 20x 3#   Prone I and Y ((T NV)) 20x ea                                                           Manual Intervention (76904)     Shoulder PROM- all directions 5'    Shoulder mobilizations- post/inf, post 5'                        Medbridge access code: [email or web address]  Access Code: [email or web address]  URL: [email or web address]  Date: 01/26/2022  Prepared by: Kae Anderson    Exercises  Supine Shoulder External Rotation with Dowel - 2 x daily - 7 x weekly - 3 sets - 10 reps - 5 s hold  Supine Shoulder Flexion with Dowel - 2 x daily - 7 x weekly - 3 sets - 10 reps - 5 s hold  Shoulder Flexion Wall Slide with Towel - 2 x daily - 7 x weekly - 3 sets - 10 reps - 5 s hold  Shoulder External Rotation - 2 x daily - 7 x weekly - 3 sets - 10 reps - 5 s hold  Seated Scapular Retraction - 2 x daily - 7 x weekly - 3 sets - 10 reps  Standing Shoulder Shrugs - 2 x daily - 7 x weekly - 3 sets - 10 reps  Seated Neck Sidebending Stretch - 2 x daily - 7 x weekly - 3 sets - 30\" hold  Standing Shoulder Posterior Capsule Stretch - 2 x daily - 7 x weekly - 3 sets - 30 s hold  Prone Shoulder Row - 1 x daily - 7 x weekly - 3 sets - 10 reps  Prone Single Arm Shoulder Y - 1 x daily - 7 x weekly - 3 sets - 10 reps  Prone Shoulder Extension - Single Arm - 1 x daily - 7 x weekly - 3 sets - 10 reps                  Patient Education 10' Pt education with HEP and progression of PT along with compliance with HEP to aide with formal PT for optimal outcomes. Therapeutic Exercise and NMR EXR  [x] (24210) Provided verbal/tactile cueing for activities related to strengthening, flexibility, endurance, ROM  for improvements in scapular, scapulothoracic and UE control with self care, reaching, carrying, lifting, house/yardwork, driving/computer work.     [x] (41449) Provided verbal/tactile cueing for activities related to improving balance, coordination, kinesthetic sense, posture, motor skill, proprioception  to assist with  scapular, scapulothoracic and UE control with self care, reaching, carrying, lifting, house/yardwork, driving/computer work. Therapeutic Activities:    [x] (40843 or 40801) Provided verbal/tactile cueing for activities related to improving balance, coordination, kinesthetic sense, posture, motor skill, proprioception and motor activation to allow for proper function of scapular, scapulothoracic and UE control with self care, carrying, lifting, driving/computer work.      Home Exercise Program:    [x] (84615) Reviewed/Progressed HEP activities related to strengthening, flexibility, endurance, ROM of scapular, scapulothoracic and UE control with self care, reaching, carrying, lifting, house/yardwork, driving/computer work  [x] (09778) Reviewed/Progressed HEP activities related to improving balance, coordination, kinesthetic sense, posture, motor skill, proprioception of scapular, scapulothoracic and UE control with self care, reaching, carrying, lifting, house/yardwork, driving/computer work      Manual Treatments:  PROM / STM / Oscillations-Mobs:  G-I, II, III, IV (PA's, Inf., Post.)  [x] (75109) Provided manual therapy to mobilize soft tissue/joints of cervical/CT, scapular GHJ and UE for the purpose of modulating pain, promoting relaxation,  increasing ROM, reducing/eliminating soft tissue swelling/inflammation/restriction, improving soft tissue extensibility and allowing for proper ROM for normal function with self care, reaching, carrying, lifting, house/yardwork, driving/computer work    Modalities:      Charges:  Timed Code Treatment Minutes: 39'   Total Treatment Minutes:  45'   BWC:  TE TIME:  NMR TIME:  MANUAL TIME:  UNTIMED MINUTES:  Medicare Total:         V# 2  $-200      [] EVAL (LOW) 95089 (typically 20 minutes face-to-face) x 1  [] EVAL (MOD) 61331 (typically 30 minutes face-to-face)  [] EVAL (HIGH) 95933 (typically 45 minutes face-to-face)  [] RE-EVAL     [x] OA(26265) x 2    [] IONTO  [x] NMR (57567) 1    [] VASO  [] Manual (83610) x      [] Other:  [] TA x      [] Mech Traction (20866)  [] ES(attended) (66086)     [] ES (un) (00607):    ASSESSMENT:  See eval    GOALS:   Short Term Goals: To be achieved in: 2 weeks  1. Independent in HEP and progression per patient tolerance, in order to prevent re-injury. [] Progressing: [] Met: [] Not Met: [] Adjusted   2. Patient will have a decrease in pain to facilitate improvement in movement, function, and ADLs as indicated by Functional Deficits. [] Progressing: [] Met: [] Not Met: [] Adjusted     Long Term Goals: To be achieved in: 12 weeks  1. Disability index score of 20% or less for the QuickDash/Oswestry to assist with reaching prior level of function. [] Progressing: [] Met: [] Not Met: [] Adjusted   2. Patient will demonstrate increased left AROM to equal the opposite side bilaterally to allow for ability to reach overhead without pain or restriction as indicated by patients Functional Deficits. [] Progressing: [] Met: [] Not Met: [] Adjusted   3. Patient will demonstrate an increase in strength in left UE to 4+/5 to allow for ability to lift objects overhead as indicated by patients Functional Deficits. [] Progressing: [] Met: [] Not Met: [] Adjusted   4. Patient will return to all transfers, work activities, and functional activities without increased symptoms or restriction. [] Progressing: [] Met: [] Not Met: [] Adjusted   5. Patient will have 0/10 pain with ADL's.  [] Progressing: [] Met: [] Not Met: [] Adjusted   6. Patient stated goal: Return to working out pain and restriction free. [] Progressing: [] Met: [] Not Met: [] Adjusted    Overall Progression Towards Functional goals/ Treatment Progress Update:  [x] Patient is progressing as expected towards functional goals listed.     [] Progression is slowed due to complexities/Impairments listed. [] Progression has been slowed due to co-morbidities. [] Plan just implemented, too soon to assess goals progression <30days   [] Goals require adjustment due to lack of progress  [] Patient is not progressing as expected and requires additional follow up with physician  [] Other    Prognosis for POC: [x] Good [] Fair  [] Poor    Patient requires continued skilled intervention: [x] Yes  [] No    Treatment/Activity Tolerance:  [x] Patient able to complete treatment  [] Patient limited by fatigue  [] Patient limited by pain    [] Patient limited by other medical complications  [] Other:     Return to Play: (if applicable)   []  Stage 1: Intro to Strength   []  Stage 2: Return to Run and Strength   []  Stage 3: Return to Jump and Strength   []  Stage 4: Dynamic Strength and Agility   []  Stage 5: Sport Specific Training     []  Ready to Return to Play, Meets All Above Stages   []  Not Ready for Return to Sports   Comments:                         PLAN: See eval  [x] Continue per plan of care [] Alter current plan (see comments above)  [] Plan of care initiated [] Hold pending MD visit [] Discharge    Electronically signed by:  Bella Steel PTA    Note: If patient does not return for scheduled/ recommended follow up visits, this note will serve as a discharge from care along with most recent update on progress.

## 2022-02-02 ENCOUNTER — HOSPITAL ENCOUNTER (OUTPATIENT)
Dept: PHYSICAL THERAPY | Age: 67
Setting detail: THERAPIES SERIES
Discharge: HOME | End: 2022-02-02
Payer: MEDICARE

## 2022-02-02 PROCEDURE — 97140 MANUAL THERAPY 1/> REGIONS: CPT

## 2022-02-02 PROCEDURE — 97112 NEUROMUSCULAR REEDUCATION: CPT

## 2022-02-02 PROCEDURE — 97110 THERAPEUTIC EXERCISES: CPT

## 2022-02-02 NOTE — FLOWSHEET NOTE
[facility] the [facility], [address] [place] [address] [place] [address], [address]  Phone: [phone], [phone]    Physical Therapy Treatment Note/ Progress Report:     Date:  2022    Patient Name:  Sawyer Mayen    :  1955  MRN: [id number]  Restrictions/Precautions:    Medical/Treatment Diagnosis Information:  · Diagnosis: L incomplete RTC tear and L impingement syndrome  · Treatment Diagnosis: S46.012D and M14.09  Insurance/Certification information:  PT Insurance Information: Medicare  Physician Information:  Referring Practitioner: Juan Diego Schumacher  Has the plan of care been signed (Y/N):        []  Yes  [x]  No     Date of Patient follow up with Physician: 2/15/2022    Is this a Progress Report:     []  Yes  [x]  No      If Yes:  Date Range for reporting period:  Initial Eval: 2022  Beginnin2022 --- Endin2022     Progress report will be due (10 Rx or 30 days whichever is less):      Recertification will be due (POC Duration  / 90 days whichever is less): 2022       Visit # Insurance Allowable Auth Required   In Person 3 Med Nec []  Yes     []  No    Tele Health   []  Yes     []  No    Total 3         Functional Scale: Quick Dash: 36% (Total Number Sum: 2755)   Date assessed: 2022     Latex Allergy:  [x]NO      []YES  Preferred Language for Healthcare:   [x]English       []other:    Pain level:  currently 0/10; worst 5/10    SUBJECTIVE: Patient reports not noticing any improvements yet, does state increased soreness.      OBJECTIVE: See eval   Observation:    Test measurements:      RESTRICTIONS/PRECAUTIONS: Osteopenia    Exercises/Interventions:   Therapeutic Ex (57531)  Therapeutic Activity (21200)  NMR re-education (23846) Sets/Reps Notes/CUES   Pulley NV 5'         T-band Row / Extension  Red x30 / Red x20    T-Band IR / ER  Red x20 / Red x20    Wall push up  x20    Ball On Wall 10 x ea ??, ??, CW/CCW        UT stretch 30\" x 3    Posterior capsule stretch 30\" x 3    Wall flexion stretch with ecc lower 5\" x20     Doorway ER stretch  10x10\"         Bilateral ER 5\" x 20 Red TBand         Supine Cane ER 5\" x 20    Supine Cane flexion 5\" x 20         Prone row 20x 3#   Prone I and Y ((T NV)) 20x ea    Prone Scap Squeeze 20 x          SL ER 20 x                                                  Manual Intervention (70292)     Shoulder PROM- all directions 5'    Shoulder mobilizations- post/inf, post 5'                        Medbridge access code: [email or web address]  Access Code: [email or web address]  URL: [email or web address]  Date: 01/26/2022  Prepared by: Gala Haider    Exercises  Supine Shoulder External Rotation with Dowel - 2 x daily - 7 x weekly - 3 sets - 10 reps - 5 s hold  Supine Shoulder Flexion with Dowel - 2 x daily - 7 x weekly - 3 sets - 10 reps - 5 s hold  Shoulder Flexion Wall Slide with Towel - 2 x daily - 7 x weekly - 3 sets - 10 reps - 5 s hold  Shoulder External Rotation - 2 x daily - 7 x weekly - 3 sets - 10 reps - 5 s hold  Seated Scapular Retraction - 2 x daily - 7 x weekly - 3 sets - 10 reps  Standing Shoulder Shrugs - 2 x daily - 7 x weekly - 3 sets - 10 reps  Seated Neck Sidebending Stretch - 2 x daily - 7 x weekly - 3 sets - 30\" hold  Standing Shoulder Posterior Capsule Stretch - 2 x daily - 7 x weekly - 3 sets - 30 s hold  Prone Shoulder Row - 1 x daily - 7 x weekly - 3 sets - 10 reps  Prone Single Arm Shoulder Y - 1 x daily - 7 x weekly - 3 sets - 10 reps  Prone Shoulder Extension - Single Arm - 1 x daily - 7 x weekly - 3 sets - 10 reps                  Patient Education 10' Pt education with HEP and progression of PT along with compliance with HEP to aide with formal PT for optimal outcomes.       Therapeutic Exercise and NMR EXR  [x] (91066) Provided verbal/tactile cueing for activities related to strengthening, flexibility, endurance, ROM  for improvements in scapular, scapulothoracic and UE control with self care, reaching, carrying, lifting, house/yardwork, driving/computer work. [x] (75734) Provided verbal/tactile cueing for activities related to improving balance, coordination, kinesthetic sense, posture, motor skill, proprioception  to assist with  scapular, scapulothoracic and UE control with self care, reaching, carrying, lifting, house/yardwork, driving/computer work. Therapeutic Activities:    [x] (79888 or 16986) Provided verbal/tactile cueing for activities related to improving balance, coordination, kinesthetic sense, posture, motor skill, proprioception and motor activation to allow for proper function of scapular, scapulothoracic and UE control with self care, carrying, lifting, driving/computer work.      Home Exercise Program:    [x] (40715) Reviewed/Progressed HEP activities related to strengthening, flexibility, endurance, ROM of scapular, scapulothoracic and UE control with self care, reaching, carrying, lifting, house/yardwork, driving/computer work  [x] (72781) Reviewed/Progressed HEP activities related to improving balance, coordination, kinesthetic sense, posture, motor skill, proprioception of scapular, scapulothoracic and UE control with self care, reaching, carrying, lifting, house/yardwork, driving/computer work      Manual Treatments:  PROM / STM / Oscillations-Mobs:  G-I, II, III, IV (PA's, Inf., Post.)  [x] (48670) Provided manual therapy to mobilize soft tissue/joints of cervical/CT, scapular GHJ and UE for the purpose of modulating pain, promoting relaxation,  increasing ROM, reducing/eliminating soft tissue swelling/inflammation/restriction, improving soft tissue extensibility and allowing for proper ROM for normal function with self care, reaching, carrying, lifting, house/yardwork, driving/computer work    Modalities:      Charges:  Timed Code Treatment Minutes: 54'   Total Treatment Minutes:  55'   BWC:  TE TIME:  NMR TIME:  MANUAL TIME:  UNTIMED MINUTES:  Medicare Total:         V# 3  $-360      [] YANI (LOW) 35522 (typically 20 minutes face-to-face) x 1  [] EVAL (MOD) 33116 (typically 30 minutes face-to-face)  [] EVAL (HIGH) 90900 (typically 45 minutes face-to-face)  [] RE-EVAL     [x] WJ(36692) x 2    [] IONTO  [x] NMR (07065) 1    [] VASO  [x] Manual (40300) x 1     [] Other:  [] TA x      [] Mech Traction (94833)  [] ES(attended) (36098)     [] ES (un) (84553):    ASSESSMENT:  See eval    GOALS:   Short Term Goals: To be achieved in: 2 weeks  1. Independent in HEP and progression per patient tolerance, in order to prevent re-injury. [x] Progressing: [] Met: [] Not Met: [] Adjusted   2. Patient will have a decrease in pain to facilitate improvement in movement, function, and ADLs as indicated by Functional Deficits. [x] Progressing: [] Met: [] Not Met: [] Adjusted     Long Term Goals: To be achieved in: 12 weeks  1. Disability index score of 20% or less for the QuickDash/Oswestry to assist with reaching prior level of function. [x] Progressing: [] Met: [] Not Met: [] Adjusted   2. Patient will demonstrate increased left AROM to equal the opposite side bilaterally to allow for ability to reach overhead without pain or restriction as indicated by patients Functional Deficits. [x] Progressing: [] Met: [] Not Met: [] Adjusted   3. Patient will demonstrate an increase in strength in left UE to 4+/5 to allow for ability to lift objects overhead as indicated by patients Functional Deficits. [x] Progressing: [] Met: [] Not Met: [] Adjusted   4. Patient will return to all transfers, work activities, and functional activities without increased symptoms or restriction. [x] Progressing: [] Met: [] Not Met: [] Adjusted   5. Patient will have 0/10 pain with ADL's. [x] Progressing: [] Met: [] Not Met: [] Adjusted   6. Patient stated goal: Return to working out pain and restriction free.    [x] Progressing: [] Met: [] Not Met: [] Adjusted    Overall Progression Towards Functional goals/ Treatment Progress Update:  [x]

## 2022-02-07 ENCOUNTER — HOSPITAL ENCOUNTER (OUTPATIENT)
Dept: PHYSICAL THERAPY | Age: 67
Setting detail: THERAPIES SERIES
Discharge: HOME | End: 2022-02-07
Payer: MEDICARE

## 2022-02-07 PROCEDURE — 97112 NEUROMUSCULAR REEDUCATION: CPT | Performed by: PHYSICAL THERAPY ASSISTANT

## 2022-02-07 PROCEDURE — 97140 MANUAL THERAPY 1/> REGIONS: CPT | Performed by: PHYSICAL THERAPY ASSISTANT

## 2022-02-07 PROCEDURE — 97110 THERAPEUTIC EXERCISES: CPT | Performed by: PHYSICAL THERAPY ASSISTANT

## 2022-02-07 NOTE — FLOWSHEET NOTE
[facility], [address] [place] [address] [address], [address]  Phone: [phone], [phone]    Physical Therapy Treatment Note/ Progress Report:     Date:  2022    Patient Name:  Tunde Sargent    :  1955  MRN: [id number]  Restrictions/Precautions:    Medical/Treatment Diagnosis Information:  · Diagnosis: L incomplete RTC tear and L impingement syndrome  · Treatment Diagnosis: S46.012D and W68.70  Insurance/Certification information:  PT Insurance Information: Medicare  Physician Information:  Referring Practitioner: Raffi Brasher  Has the plan of care been signed (Y/N):        []  Yes  [x]  No     Date of Patient follow up with Physician: 2/15/2022    Is this a Progress Report:     []  Yes  [x]  No      If Yes:  Date Range for reporting period:  Initial Eval: 2022  Beginnin2022 --- Endin2022     Progress report will be due (10 Rx or 30 days whichever is less): 1045     Recertification will be due (POC Duration  / 90 days whichever is less): 2022       Visit # Insurance Allowable Auth Required   In Person [facility] []  Yes     []  No    [facility]   []  Yes     []  No    Total 4         Functional Scale: Quick Dash: 36% (Total Number Sum: 2755)   Date assessed: 2022     Latex Allergy:  [x]NO      []YES  Preferred Language for Healthcare:   [x]English       []other:    Pain level:  currently 0/10; worst 5/10    SUBJECTIVE: Still feels pain when she performs activities like putting coat on or reaching for seat belt. However, some of the exercises that were causing discomfort she can now do without pain.      OBJECTIVE: See eval   Observation:    Test measurements:      RESTRICTIONS/PRECAUTIONS: Osteopenia    Exercises/Interventions:   Therapeutic Ex (23257)  Therapeutic Activity (26901)  NMR re-education (20179) Sets/Reps Notes/CUES   [place] NV 5'         T-band Row / Extension  Green x30 / Fleurette Cogan x20    T-Band IR / ER  Green Franne Shorts / Fleurette Cogan x20 Wall push up  x30    Ball On Wall 10 x ea ??, ??, CW/CCW        UT stretch 30\" x 3    Posterior capsule stretch 30\" x 3    Wall flexion stretch with ecc lower 5\" x20     Doorway ER stretch  10x10\"         Bilateral ER 5\" x 20red  Red TBand    Bilateral Hor ABD  5\" x20 Red          Supine Cane ER 5\" x 20    Supine Cane flexion 5\" x 20         Prone row 20x 3#   Prone I and Y ((T NV)) 20x ea    Prone Scap Squeeze 20 x          SL ER 20 x                                                  Manual Intervention (71873)     Shoulder PROM- all directions 5'    Shoulder mobilizations- post/inf, post 5'                        Medbridge access code: [email or web address]  Access Code: [email or web address]  URL: [email or web address]  Date: 01/26/2022  Prepared by: Cm Phelps    Exercises  Supine Shoulder External Rotation with Dowel - 2 x daily - 7 x weekly - 3 sets - 10 reps - 5 s hold  Supine Shoulder Flexion with Dowel - 2 x daily - 7 x weekly - 3 sets - 10 reps - 5 s hold  Shoulder Flexion Wall Slide with Towel - 2 x daily - 7 x weekly - 3 sets - 10 reps - 5 s hold  Shoulder External Rotation - 2 x daily - 7 x weekly - 3 sets - 10 reps - 5 s hold  Seated Scapular Retraction - 2 x daily - 7 x weekly - 3 sets - 10 reps  Standing Shoulder Shrugs - 2 x daily - 7 x weekly - 3 sets - 10 reps  Seated Neck Sidebending Stretch - 2 x daily - 7 x weekly - 3 sets - 30\" hold  Standing Shoulder Posterior Capsule Stretch - 2 x daily - 7 x weekly - 3 sets - 30 s hold  Prone Shoulder Row - 1 x daily - 7 x weekly - 3 sets - 10 reps  Prone Single Arm Shoulder Y - 1 x daily - 7 x weekly - 3 sets - 10 reps  Prone Shoulder Extension - Single Arm - 1 x daily - 7 x weekly - 3 sets - 10 reps                  Patient Education 10' Pt education with HEP and progression of PT along with compliance with HEP to aide with formal PT for optimal outcomes.       Therapeutic Exercise and NMR EXR  [x] (05755) Provided verbal/tactile cueing for activities related to strengthening, flexibility, endurance, ROM  for improvements in scapular, scapulothoracic and UE control with self care, reaching, carrying, lifting, house/yardwork, driving/computer work. [x] (14495) Provided verbal/tactile cueing for activities related to improving balance, coordination, kinesthetic sense, posture, motor skill, proprioception  to assist with  scapular, scapulothoracic and UE control with self care, reaching, carrying, lifting, house/yardwork, driving/computer work. Therapeutic Activities:    [x] (45680 or 36234) Provided verbal/tactile cueing for activities related to improving balance, coordination, kinesthetic sense, posture, motor skill, proprioception and motor activation to allow for proper function of scapular, scapulothoracic and UE control with self care, carrying, lifting, driving/computer work.      Home Exercise Program:    [x] (77771) Reviewed/Progressed HEP activities related to strengthening, flexibility, endurance, ROM of scapular, scapulothoracic and UE control with self care, reaching, carrying, lifting, house/yardwork, driving/computer work  [x] (60242) Reviewed/Progressed HEP activities related to improving balance, coordination, kinesthetic sense, posture, motor skill, proprioception of scapular, scapulothoracic and UE control with self care, reaching, carrying, lifting, house/yardwork, driving/computer work      Manual Treatments:  PROM / STM / Oscillations-Mobs:  G-I, II, III, IV (PA's, Inf., Post.)  [x] (84889) Provided manual therapy to mobilize soft tissue/joints of cervical/CT, scapular GHJ and UE for the purpose of modulating pain, promoting relaxation,  increasing ROM, reducing/eliminating soft tissue swelling/inflammation/restriction, improving soft tissue extensibility and allowing for proper ROM for normal function with self care, reaching, carrying, lifting, house/yardwork, driving/computer work    Modalities:      Charges:  Timed Code Treatment Minutes: 39' Total Treatment Minutes:  45'   BWC:  TE TIME:  NMR TIME:  MANUAL TIME:  UNTIMED MINUTES:  Medicare Total:         V# 4  $-460      [] EVAL (LOW) 18439 (typically 20 minutes face-to-face) x 1  [] EVAL (MOD) 89602 (typically 30 minutes face-to-face)  [] EVAL (HIGH) 53289 (typically 45 minutes face-to-face)  [] RE-EVAL     [x] PN(49972) x 1    [] IONTO  [x] NMR (30862) 1    [] VASO  [x] Manual (89065) x 1     [] Other:  [] TA x      [] Mech Traction (60221)  [] ES(attended) (59158)     [] ES (un) (50962):    ASSESSMENT:  See eval    GOALS:   Short Term Goals: To be achieved in: 2 weeks  1. Independent in HEP and progression per patient tolerance, in order to prevent re-injury. [x] Progressing: [] Met: [] Not Met: [] Adjusted   2. Patient will have a decrease in pain to facilitate improvement in movement, function, and ADLs as indicated by Functional Deficits. [x] Progressing: [] Met: [] Not Met: [] Adjusted     Long Term Goals: To be achieved in: 12 weeks  1. Disability index score of 20% or less for the QuickDash/Oswestry to assist with reaching prior level of function. [x] Progressing: [] Met: [] Not Met: [] Adjusted   2. Patient will demonstrate increased left AROM to equal the opposite side bilaterally to allow for ability to reach overhead without pain or restriction as indicated by patients Functional Deficits. [x] Progressing: [] Met: [] Not Met: [] Adjusted   3. Patient will demonstrate an increase in strength in left UE to 4+/5 to allow for ability to lift objects overhead as indicated by patients Functional Deficits. [x] Progressing: [] Met: [] Not Met: [] Adjusted   4. Patient will return to all transfers, work activities, and functional activities without increased symptoms or restriction. [x] Progressing: [] Met: [] Not Met: [] Adjusted   5. Patient will have 0/10 pain with ADL's. [x] Progressing: [] Met: [] Not Met: [] Adjusted   6.  Patient stated goal: Return to working out pain and restriction free. [x] Progressing: [] Met: [] Not Met: [] Adjusted    Overall Progression Towards Functional goals/ Treatment Progress Update:  [x] Patient is progressing as expected towards functional goals listed. [] Progression is slowed due to complexities/Impairments listed. [] Progression has been slowed due to co-morbidities. [] Plan just implemented, too soon to assess goals progression <30days   [] Goals require adjustment due to lack of progress  [] Patient is not progressing as expected and requires additional follow up with physician  [] Other    Prognosis for POC: [x] Good [] Fair  [] Poor    Patient requires continued skilled intervention: [x] Yes  [] No    Treatment/Activity Tolerance:  [x] Patient able to complete treatment  [] Patient limited by fatigue  [] Patient limited by pain    [] Patient limited by other medical complications  [] Other:     Return to Play: (if applicable)   []  Stage 1: Intro to Strength   []  Stage 2: Return to Run and Strength   []  Stage 3: Return to Jump and Strength   []  Stage 4: Dynamic Strength and Agility   []  Stage 5: Sport Specific Training     []  Ready to Return to Play, Meets All Above Stages   []  Not Ready for Return to Sports   Comments:                         PLAN: See eval  [x] Continue per plan of care [] Alter current plan (see comments above)  [] Plan of care initiated [] Hold pending MD visit [] Discharge    Electronically signed by:  Keon Pa PTA    Note: If patient does not return for scheduled/ recommended follow up visits, this note will serve as a discharge from care along with most recent update on progress.

## 2022-02-08 ASSESSMENT — PATIENT HEALTH QUESTIONNAIRE - PHQ9
1. LITTLE INTEREST OR PLEASURE IN DOING THINGS: 0
SUM OF ALL RESPONSES TO PHQ QUESTIONS 1-9: 0
SUM OF ALL RESPONSES TO PHQ QUESTIONS 1-9: 0
2. FEELING DOWN, DEPRESSED OR HOPELESS: 0
SUM OF ALL RESPONSES TO PHQ QUESTIONS 1-9: 0
SUM OF ALL RESPONSES TO PHQ QUESTIONS 1-9: 0
SUM OF ALL RESPONSES TO PHQ9 QUESTIONS 1 & 2: 0

## 2022-02-08 ASSESSMENT — LIFESTYLE VARIABLES
HAVE YOU OR SOMEONE ELSE BEEN INJURED AS A RESULT OF YOUR DRINKING: 0
AUDIT TOTAL SCORE: 0
HAS A RELATIVE, FRIEND, DOCTOR, OR ANOTHER HEALTH PROFESSIONAL EXPRESSED CONCERN ABOUT YOUR DRINKING OR SUGGESTED YOU CUT DOWN: NO
HOW OFTEN DO YOU HAVE A DRINK CONTAINING ALCOHOL: 1
HAS A RELATIVE, FRIEND, DOCTOR, OR ANOTHER HEALTH PROFESSIONAL EXPRESSED CONCERN ABOUT YOUR DRINKING OR SUGGESTED YOU CUT DOWN: 0
HOW OFTEN DURING THE LAST YEAR HAVE YOU FAILED TO DO WHAT WAS NORMALLY EXPECTED FROM YOU BECAUSE OF DRINKING: NEVER
HOW MANY STANDARD DRINKS CONTAINING ALCOHOL DO YOU HAVE ON A TYPICAL DAY: 0
HOW OFTEN DURING THE LAST YEAR HAVE YOU FOUND THAT YOU WERE NOT ABLE TO STOP DRINKING ONCE YOU HAD STARTED: 0
HOW OFTEN DURING THE LAST YEAR HAVE YOU NEEDED AN ALCOHOLIC DRINK FIRST THING IN THE MORNING TO GET YOURSELF GOING AFTER A NIGHT OF HEAVY DRINKING: 0
AUDIT-C TOTAL SCORE: 0
HOW OFTEN DO YOU HAVE SIX OR MORE DRINKS ON ONE OCCASION: 0
HOW OFTEN DURING THE LAST YEAR HAVE YOU BEEN UNABLE TO REMEMBER WHAT HAPPENED THE NIGHT BEFORE BECAUSE YOU HAD BEEN DRINKING: 0
HOW OFTEN DURING THE LAST YEAR HAVE YOU HAD A FEELING OF GUILT OR REMORSE AFTER DRINKING: NEVER
HOW OFTEN DURING THE LAST YEAR HAVE YOU BEEN UNABLE TO REMEMBER WHAT HAPPENED THE NIGHT BEFORE BECAUSE YOU HAD BEEN DRINKING: NEVER
HOW OFTEN DO YOU HAVE A DRINK CONTAINING ALCOHOL: MONTHLY OR LESS
HOW OFTEN DURING THE LAST YEAR HAVE YOU FOUND THAT YOU WERE NOT ABLE TO STOP DRINKING ONCE YOU HAD STARTED: NEVER
HOW MANY STANDARD DRINKS CONTAINING ALCOHOL DO YOU HAVE ON A TYPICAL DAY: ONE OR TWO
HOW OFTEN DURING THE LAST YEAR HAVE YOU HAD A FEELING OF GUILT OR REMORSE AFTER DRINKING: 0
HOW OFTEN DURING THE LAST YEAR HAVE YOU FAILED TO DO WHAT WAS NORMALLY EXPECTED FROM YOU BECAUSE OF DRINKING: 0
HOW OFTEN DURING THE LAST YEAR HAVE YOU NEEDED AN ALCOHOLIC DRINK FIRST THING IN THE MORNING TO GET YOURSELF GOING AFTER A NIGHT OF HEAVY DRINKING: NEVER
HAVE YOU OR SOMEONE ELSE BEEN INJURED AS A RESULT OF YOUR DRINKING: NO
HOW OFTEN DO YOU HAVE SIX OR MORE DRINKS ON ONE OCCASION: NEVER
AUDIT-C TOTAL SCORE: 1
AUDIT TOTAL SCORE: 1

## 2022-02-09 ENCOUNTER — HOSPITAL ENCOUNTER (OUTPATIENT)
Dept: PHYSICAL THERAPY | Age: 67
Setting detail: THERAPIES SERIES
Discharge: HOME | End: 2022-02-09
Payer: MEDICARE

## 2022-02-09 PROCEDURE — 97110 THERAPEUTIC EXERCISES: CPT | Performed by: PHYSICAL THERAPY ASSISTANT

## 2022-02-09 PROCEDURE — 97140 MANUAL THERAPY 1/> REGIONS: CPT | Performed by: PHYSICAL THERAPY ASSISTANT

## 2022-02-09 PROCEDURE — 97112 NEUROMUSCULAR REEDUCATION: CPT | Performed by: PHYSICAL THERAPY ASSISTANT

## 2022-02-09 NOTE — FLOWSHEET NOTE
x30 / Frann Gentleman x20    T-Band IR / ER  Green x20 / Green x20    Wall push up  x30    Ball On Wall 15 x ea ??, ??, CW/CCW        UT stretch 30\" x 3    Posterior capsule stretch 30\" x 3    Wall flexion stretch with ecc lower 5\" x20     Doorway ER stretch  10x10\"         Bilateral ER 5\" x 20red  Red TBand    Bilateral Hor ABD  5\" x20 yellow          Supine Cane ER 5\" x 20    Supine Cane flexion 5\" x 20    Supine punch  1# x20     Supine flexion  1# x20              Prone row 20x 3#   Prone I and Y ((T NV)) 20x ea    Prone Scap Squeeze          SL ER / SL ABD  20 x /x20                                                 Manual Intervention (34091)     Shoulder PROM- all directions 5'    Shoulder mobilizations- post/inf, post 5'                        Medbridge access code: [email or web address]  Access Code: [email or web address]  URL: [email or web address]  Date: 01/26/2022  Prepared by: Patricio Carlson    Exercises  Supine Shoulder External Rotation with Dowel - 2 x daily - 7 x weekly - 3 sets - 10 reps - 5 s hold  Supine Shoulder Flexion with Dowel - 2 x daily - 7 x weekly - 3 sets - 10 reps - 5 s hold  Shoulder Flexion Wall Slide with Towel - 2 x daily - 7 x weekly - 3 sets - 10 reps - 5 s hold  Shoulder External Rotation - 2 x daily - 7 x weekly - 3 sets - 10 reps - 5 s hold  Seated Scapular Retraction - 2 x daily - 7 x weekly - 3 sets - 10 reps  Standing Shoulder Shrugs - 2 x daily - 7 x weekly - 3 sets - 10 reps  Seated Neck Sidebending Stretch - 2 x daily - 7 x weekly - 3 sets - 30\" hold  Standing Shoulder Posterior Capsule Stretch - 2 x daily - 7 x weekly - 3 sets - 30 s hold  Prone Shoulder Row - 1 x daily - 7 x weekly - 3 sets - 10 reps  Prone Single Arm Shoulder Y - 1 x daily - 7 x weekly - 3 sets - 10 reps  Prone Shoulder Extension - Single Arm - 1 x daily - 7 x weekly - 3 sets - 10 reps                  Patient Education 10' Pt education with HEP and progression of PT along with compliance with HEP to aide with formal PT for optimal outcomes. Therapeutic Exercise and NMR EXR  [x] (97289) Provided verbal/tactile cueing for activities related to strengthening, flexibility, endurance, ROM  for improvements in scapular, scapulothoracic and UE control with self care, reaching, carrying, lifting, house/yardwork, driving/computer work. [x] (52951) Provided verbal/tactile cueing for activities related to improving balance, coordination, kinesthetic sense, posture, motor skill, proprioception  to assist with  scapular, scapulothoracic and UE control with self care, reaching, carrying, lifting, house/yardwork, driving/computer work. Therapeutic Activities:    [x] (58700 or 65014) Provided verbal/tactile cueing for activities related to improving balance, coordination, kinesthetic sense, posture, motor skill, proprioception and motor activation to allow for proper function of scapular, scapulothoracic and UE control with self care, carrying, lifting, driving/computer work.      Home Exercise Program:    [x] (33443) Reviewed/Progressed HEP activities related to strengthening, flexibility, endurance, ROM of scapular, scapulothoracic and UE control with self care, reaching, carrying, lifting, house/yardwork, driving/computer work  [x] (13522) Reviewed/Progressed HEP activities related to improving balance, coordination, kinesthetic sense, posture, motor skill, proprioception of scapular, scapulothoracic and UE control with self care, reaching, carrying, lifting, house/yardwork, driving/computer work      Manual Treatments:  PROM / STM / Oscillations-Mobs:  G-I, II, III, IV (PA's, Inf., Post.)  [x] (13836) Provided manual therapy to mobilize soft tissue/joints of cervical/CT, scapular GHJ and UE for the purpose of modulating pain, promoting relaxation,  increasing ROM, reducing/eliminating soft tissue swelling/inflammation/restriction, improving soft tissue extensibility and allowing for proper ROM for normal function with self care, reaching, carrying, lifting, house/yardwork, driving/computer work    Modalities:      Charges:  Timed Code Treatment Minutes: 39'   Total Treatment Minutes:  45'   [facility]:  [facility] TIME:  MANUAL TIME:  UNTIMED MINUTES:  Medicare Total:         V# 5  $560      [] EVAL (LOW) 23510 (typically 20 minutes face-to-face) x 1  [] EVAL (MOD) 12014 (typically 30 minutes face-to-face)  [] EVAL (HIGH) 08636 (typically 45 minutes face-to-face)  [] RE-EVAL     [x] GV(48026) x 1    [] IONTO  [x] NMR (45313) 1    [] VASO  [x] Manual (95311) x 1     [] Other:  [] TA x      [] Mech Traction (03045)  [] ES(attended) (98537)     [] ES (un) (12606):    ASSESSMENT:  See eval    GOALS:   Short Term Goals: To be achieved in: 2 weeks  1. Independent in HEP and progression per patient tolerance, in order to prevent re-injury. [x] Progressing: [] Met: [] Not Met: [] Adjusted   2. Patient will have a decrease in pain to facilitate improvement in movement, function, and ADLs as indicated by Functional Deficits. [x] Progressing: [] Met: [] Not Met: [] Adjusted     Long Term Goals: To be achieved in: 12 weeks  1. Disability index score of 20% or less for the QuickDash/Oswestry to assist with reaching prior level of function. [x] Progressing: [] Met: [] Not Met: [] Adjusted   2. Patient will demonstrate increased left AROM to equal the opposite side bilaterally to allow for ability to reach overhead without pain or restriction as indicated by patients Functional Deficits. [x] Progressing: [] Met: [] Not Met: [] Adjusted   3. Patient will demonstrate an increase in strength in left UE to 4+/5 to allow for ability to lift objects overhead as indicated by patients Functional Deficits. [x] Progressing: [] Met: [] Not Met: [] Adjusted   4. Patient will return to all transfers, work activities, and functional activities without increased symptoms or restriction. [x] Progressing: [] Met: [] Not Met: [] Adjusted   5.  Patient will have 0/10 pain with ADL's.  [x] Progressing: [] Met: [] Not Met: [] Adjusted   6. Patient stated goal: Return to working out pain and restriction free. [x] Progressing: [] Met: [] Not Met: [] Adjusted    Overall Progression Towards Functional goals/ Treatment Progress Update:  [x] Patient is progressing as expected towards functional goals listed. [] Progression is slowed due to complexities/Impairments listed. [] Progression has been slowed due to co-morbidities. [] Plan just implemented, too soon to assess goals progression <30days   [] Goals require adjustment due to lack of progress  [] Patient is not progressing as expected and requires additional follow up with physician  [] Other    Prognosis for POC: [x] Good [] Fair  [] Poor    Patient requires continued skilled intervention: [x] Yes  [] No    Treatment/Activity Tolerance:  [x] Patient able to complete treatment  [] Patient limited by fatigue  [] Patient limited by pain    [] Patient limited by other medical complications  [] Other:     Return to Play: (if applicable)   []  Stage 1: Intro to Strength   []  Stage 2: Return to Run and Strength   []  Stage 3: Return to Jump and Strength   []  Stage 4: Dynamic Strength and Agility   []  Stage 5: Sport Specific Training     []  Ready to Return to Play, Meets All Above Stages   []  Not Ready for Return to Sports   Comments:                         PLAN: See eval  [x] Continue per plan of care [] Alter current plan (see comments above)  [] Plan of care initiated [] Hold pending MD visit [] Discharge    Electronically signed by:  Abhijit Hobbs PTA    Note: If patient does not return for scheduled/ recommended follow up visits, this note will serve as a discharge from care along with most recent update on progress.

## 2022-02-14 ENCOUNTER — APPOINTMENT (OUTPATIENT)
Dept: PHYSICAL THERAPY | Age: 67
End: 2022-02-14
Payer: MEDICARE

## 2022-02-15 ENCOUNTER — VIRTUAL VISIT (OUTPATIENT)
Dept: FAMILY MEDICINE CLINIC | Age: 67
End: 2022-02-15
Payer: MEDICARE

## 2022-02-15 DIAGNOSIS — Z00.00 ROUTINE GENERAL MEDICAL EXAMINATION AT A HEALTH CARE FACILITY: Primary | ICD-10-CM

## 2022-02-15 PROCEDURE — 4040F PNEUMOC VAC/ADMIN/RCVD: CPT | Performed by: FAMILY MEDICINE

## 2022-02-15 PROCEDURE — G0439 PPPS, SUBSEQ VISIT: HCPCS | Performed by: FAMILY MEDICINE

## 2022-02-15 PROCEDURE — 3017F COLORECTAL CA SCREEN DOC REV: CPT | Performed by: FAMILY MEDICINE

## 2022-02-15 PROCEDURE — 1123F ACP DISCUSS/DSCN MKR DOCD: CPT | Performed by: FAMILY MEDICINE

## 2022-02-15 SDOH — ECONOMIC STABILITY: FOOD INSECURITY: WITHIN THE PAST 12 MONTHS, THE FOOD YOU BOUGHT JUST DIDN'T LAST AND YOU DIDN'T HAVE MONEY TO GET MORE.: NEVER TRUE

## 2022-02-15 SDOH — ECONOMIC STABILITY: FOOD INSECURITY: WITHIN THE PAST 12 MONTHS, YOU WORRIED THAT YOUR FOOD WOULD RUN OUT BEFORE YOU GOT MONEY TO BUY MORE.: NEVER TRUE

## 2022-02-15 ASSESSMENT — LIFESTYLE VARIABLES
HOW OFTEN DO YOU HAVE SIX OR MORE DRINKS ON ONE OCCASION: 0
HOW OFTEN DURING THE LAST YEAR HAVE YOU FAILED TO DO WHAT WAS NORMALLY EXPECTED FROM YOU BECAUSE OF DRINKING: 0
AUDIT TOTAL SCORE: 1
HOW OFTEN DURING THE LAST YEAR HAVE YOU FOUND THAT YOU WERE NOT ABLE TO STOP DRINKING ONCE YOU HAD STARTED: 0
AUDIT-C TOTAL SCORE: 1
HOW OFTEN DURING THE LAST YEAR HAVE YOU HAD A FEELING OF GUILT OR REMORSE AFTER DRINKING: 0
HAS A RELATIVE, FRIEND, DOCTOR, OR ANOTHER HEALTH PROFESSIONAL EXPRESSED CONCERN ABOUT YOUR DRINKING OR SUGGESTED YOU CUT DOWN: 0
HOW OFTEN DURING THE LAST YEAR HAVE YOU BEEN UNABLE TO REMEMBER WHAT HAPPENED THE NIGHT BEFORE BECAUSE YOU HAD BEEN DRINKING: 0
HOW OFTEN DO YOU HAVE A DRINK CONTAINING ALCOHOL: 1
HAVE YOU OR SOMEONE ELSE BEEN INJURED AS A RESULT OF YOUR DRINKING: 0
HOW OFTEN DURING THE LAST YEAR HAVE YOU NEEDED AN ALCOHOLIC DRINK FIRST THING IN THE MORNING TO GET YOURSELF GOING AFTER A NIGHT OF HEAVY DRINKING: 0
HOW MANY STANDARD DRINKS CONTAINING ALCOHOL DO YOU HAVE ON A TYPICAL DAY: 0

## 2022-02-15 ASSESSMENT — PATIENT HEALTH QUESTIONNAIRE - PHQ9
1. LITTLE INTEREST OR PLEASURE IN DOING THINGS: 0
SUM OF ALL RESPONSES TO PHQ QUESTIONS 1-9: 0
SUM OF ALL RESPONSES TO PHQ QUESTIONS 1-9: 0
SUM OF ALL RESPONSES TO PHQ9 QUESTIONS 1 & 2: 0
SUM OF ALL RESPONSES TO PHQ QUESTIONS 1-9: 0
SUM OF ALL RESPONSES TO PHQ QUESTIONS 1-9: 0
2. FEELING DOWN, DEPRESSED OR HOPELESS: 0

## 2022-02-15 ASSESSMENT — SOCIAL DETERMINANTS OF HEALTH (SDOH): HOW HARD IS IT FOR YOU TO PAY FOR THE VERY BASICS LIKE FOOD, HOUSING, MEDICAL CARE, AND HEATING?: NOT HARD AT ALL

## 2022-02-15 NOTE — PROGRESS NOTES
Medicare Annual Wellness Visit  Name: Kala Jarrett Date: 2/15/2022   MRN: <[id number]> Sex: Female   Age: 77 y.o. Ethnicity: Non- / Non    : 1955 Race: White (non-)      Jethro Salinas is here for Medicare AWV    Screenings for behavioral, psychosocial and functional/safety risks, and cognitive dysfunction are all negative except as indicated below. These results, as well as other patient data from the [address] form, are documented in Flowsheets linked to this Encounter. Allergies   Allergen Reactions    Erythromycin Nausea Only       Prior to Visit Medications    Medication Sig Taking? Authorizing Provider   pseudoephedrine (SUDAFED) 60 MG tablet Take 1 tablet by mouth 2 times daily Yes Jose Keller MD   celecoxib (CELEBREX) 200 MG capsule TAKE ONE CAPSULE BY MOUTH TWO TIMES DAILY AS NEEDED Yes Jose Keller MD   ibandronate (BONIVA) 150 MG tablet Take 1 tablet by mouth every 30 days Take one (1) tablet once per month in the morning with a full glass of water, on an empty stomach, and do not take anything else by mouth or lie down for the next 30 minutes. Yes Jose Keller MD   apixaban (ELIQUIS) 5 MG TABS tablet Take 1 tablet by mouth 2 times daily Yes oJse Keller MD   famciclovir [facility]) 500 MG tablet Take 2 tablets twice a day per fever blister outbreak Yes Jose Keller MD   DiphenhydrAMINE HCl (BENADRYL ALLERGY PO) Take 25 mg by mouth nightly Yes Historical Provider, MD   Cholecalciferol (VITAMIN D) 2000 UNITS CAPS capsule Take 2,000 Units by mouth Yes Historical Provider, MD   Calcium Carbonate-Vitamin D (CALCIUM-VITAMIN D) 500-200 MG-UNIT per tablet Take 1 tablet by mouth 2 times daily (with meals). Yes Historical Provider, MD   Glucosamine-Chondroit-Vit C-Mn (GLUCOSAMINE 1500 COMPLEX) CAPS Take 2 tablets by mouth daily.    Yes Historical Provider, MD   therapeutic multivitamin-minerals [facility]) tablet Take 1 tablet by mouth daily. Yes Historical Provider, MD       Past Medical History:   Diagnosis Date    Allergic rhinitis     Osteoarthritis     Osteoporosis     Pulmonary emboli ([address] 07/2018       Past Surgical History:   Procedure Laterality Date    BUNIONECTOMY      COLONOSCOPY  01/2017    normal    HYSTERECTOMY      TUBAL LIGATION         Family History   Problem Relation Age of Onset    Cancer Mother 79        breast, NMSC    High Blood Pressure Mother     Breast Cancer Mother         in her 66's    Cancer Father 61        colon ca, prostate    High Blood Pressure Father     Cancer Sister 36        breast    Hypertension Brother     Prostate Cancer Brother     Atrial Fibrillation Brother     Hypertension Brother     Breast Cancer Maternal Aunt         in her 42's       CareTeam (Including outside providers/suppliers regularly involved in providing care):   Patient Care Team:  Dandre Joy MD as PCP - Manju Cerda MD as PCP - [facility] Empaneled Provider  Angie Kurtz MD as Consulting Physician (Otolaryngology)    Wt Readings from Last 3 Encounters:   03/16/21 139 lb 9.6 oz (63.3 kg)   02/08/21 141 lb (64 kg)   10/12/20 139 lb 15.9 oz (63.5 kg)     Patient reported vitals as follows:  /80  Pulse 56  Weight 137 lbs    Based upon direct observation of the patient, evaluation of cognition reveals recent and remote memory intact. Patient's complete Health Risk Assessment and screening values have been reviewed and are found in Flowsheets. The following problems were reviewed today and where indicated follow up appointments were made and/or referrals ordered.     Positive Risk Factor Screenings with Interventions:     Fall Risk:  2 or more falls in past year?: (!) yes  Fall with injury in past year?: (!) yes  Fall Risk Interventions:    · Patient declines any further evaluation/treatment for this issue        General Health and ACP:  General  In general, how would you say your health is?: Very Good  In the past 7 days, have you experienced any of the following?  New or Increased Pain, New or Increased Fatigue, Loneliness, Social Isolation, Stress or Anger?: (!) New or Increased Pain (Do to shoulder injury and doing physicat therapy)  Do you get the social and emotional support that you need?: Yes  Do you have a Living Will?: Yes  Advance Directives     Power of  Living Will ACP-Advance Directive ACP-Power of     Not on File Not on File Not on File Not on File      General Health Risk Interventions:  · No Living Will: ACP documents already completed- patient asked to provide copy to the office      Safety:  Safety  Do you have working smoke detectors?: Yes  Have all throw rugs been removed or fastened?: (!) No  Do you have non-slip mats or surfaces in all bathtubs/showers?: Yes  Do all of your stairways have a railing or banister?: Yes  Are your doorways, halls and stairs free of clutter?: Yes  Do you always fasten your seatbelt when you are in a car?: Yes  Safety Interventions:  · Patient declines any further evaluation/treatment for this issue     Personalized Preventive Plan   Current Health Maintenance Status  Immunization History   Administered Date(s) Administered    COVID-19, [place], Primary or Immunocompromised, PF, 100mcg/0.5mL 01/13/2021, 02/09/2021    Influenza 10/01/2015    Influenza Vaccine, unspecified formulation 10/15/2016    Influenza Virus Vaccine 10/11/2017, 10/15/2018, 10/11/2019, 10/26/2021    Influenza, Quadv, IM, PF (6 mo and older Fluzone, Flulaval, Fluarix, and 3 yrs and older Afluria) 10/19/2020    Pneumococcal Polysaccharide ([id number]) 09/14/2020    Td (Adult), 2 Lf Tetanus Toxoid, Pf (Td, Absorbed) 02/26/2019    Tdap (Boostrix, Adacel) 12/01/2008    Zoster Live (Zostavax) 01/12/2016    Zoster Recombinant (Shingrix) 08/14/2018, 11/30/2018        Health Maintenance   Topic Date Due    COVID-19 Vaccine (3 - Booster for Moderna series) 07/09/2021    Annual Wellness Visit (AWV)  02/09/2022    Colon cancer screen colonoscopy  01/26/2022    Breast cancer screen  06/28/2022    Depression Screen  02/08/2023    Lipid screen  02/02/2026    DTaP/Tdap/Td vaccine (3 - Td or Tdap) 02/26/2029    DEXA (modify frequency per FRAX score)  Completed    Flu vaccine  Completed    Shingles Vaccine  Completed    Pneumococcal 65+ years Vaccine  Completed    Hepatitis C screen  Completed    Hepatitis A vaccine  Aged Out    Hepatitis B vaccine  Aged Out    Hib vaccine  Aged Out    Meningococcal (ACWY) vaccine  Aged Out     Recommendations for [address] Due: see orders and patient instructions/AVS.  . Recommended screening schedule for the next 5-10 years is provided to the patient in written form: see Patient Instructions/AVS.    Agnieszka Mesa LPN, 6/88/8585, performed the documented evaluation under the direct supervision of the attending physician. Rossana Lipscomb, was evaluated through a synchronous (real-time)telephone encounter. The patient (or guardian if applicable) is aware that this is a billable service. Verbal consent to proceed has been obtained within the past 12 months. The visit was conducted pursuant to the emergency declaration under the [address] [place] [address], [address] [place] [address] authority and the [employer] and [employer] General Act. Patient identification was verified, and a caregiver was present when appropriate. The patient was located in a state where the provider was credentialed to provide care. --Dez Starr LPN on 7/89/5770 at 96:53 AM    An electronic signature was used to authenticate this note. This encounter was performed under Deyanira valencia MDs, direct supervision, 2/15/2022.

## 2022-02-15 NOTE — PATIENT INSTRUCTIONS
Personalized Preventive Plan for Jose Levine - 2/15/2022  Medicare offers a range of preventive health benefits. Some of the tests and screenings are paid in full while other may be subject to a deductible, co-insurance, and/or copay. Some of these benefits include a comprehensive review of your medical history including lifestyle, illnesses that may run in your family, and various assessments and screenings as appropriate. After reviewing your medical record and screening and assessments performed today your provider may have ordered immunizations, labs, imaging, and/or referrals for you. A list of these orders (if applicable) as well as your Preventive Care list are included within your After Visit Summary for your review. Other Preventive Recommendations:    · A preventive eye exam performed by an eye specialist is recommended every 1-2 years to screen for glaucoma; cataracts, macular degeneration, and other eye disorders. · A preventive dental visit is recommended every 6 months. · Try to get at least 150 minutes of exercise per week or 10,000 steps per day on a pedometer . · Order or download the FREE \"Exercise & Physical Activity: Your Everyday Guide\" from [employer]. Call 8-713.282.6244 or search [employer] online. · You need 8380-4528 mg of calcium and 8229-4593 IU of vitamin D per day. It is possible to meet your calcium requirement with diet alone, but a vitamin D supplement is usually necessary to meet this goal.  · When exposed to the sun, use a sunscreen that protects against both UVA and UVB radiation with an SPF of 30 or greater. Reapply every 2 to 3 hours or after sweating, drying off with a towel, or swimming. · Always wear a seat belt when traveling in a car. Always wear a helmet when riding a bicycle or motorcycle.

## 2022-02-16 ENCOUNTER — OFFICE VISIT (OUTPATIENT)
Dept: FAMILY MEDICINE CLINIC | Age: 67
End: 2022-02-16
Payer: MEDICARE

## 2022-02-16 ENCOUNTER — HOSPITAL ENCOUNTER (OUTPATIENT)
Dept: PHYSICAL THERAPY | Age: 67
Setting detail: THERAPIES SERIES
Discharge: HOME | End: 2022-02-16
Payer: MEDICARE

## 2022-02-16 VITALS
BODY MASS INDEX: 21.94 KG/M2 | WEIGHT: 138 LBS | OXYGEN SATURATION: 96 % | HEART RATE: 66 BPM | SYSTOLIC BLOOD PRESSURE: 161 MMHG | DIASTOLIC BLOOD PRESSURE: 83 MMHG

## 2022-02-16 DIAGNOSIS — Z00.00 ANNUAL PHYSICAL EXAM: Primary | ICD-10-CM

## 2022-02-16 DIAGNOSIS — Z13.220 SCREENING CHOLESTEROL LEVEL: ICD-10-CM

## 2022-02-16 DIAGNOSIS — E55.9 VITAMIN D DEFICIENCY: ICD-10-CM

## 2022-02-16 DIAGNOSIS — M85.80 OSTEOPENIA, UNSPECIFIED LOCATION: ICD-10-CM

## 2022-02-16 DIAGNOSIS — R03.0 ELEVATED BP WITHOUT DIAGNOSIS OF HYPERTENSION: ICD-10-CM

## 2022-02-16 DIAGNOSIS — I82.493 ACUTE DEEP VEIN THROMBOSIS (DVT) OF OTHER SPECIFIED VEIN OF BOTH LOWER EXTREMITIES (HCC): ICD-10-CM

## 2022-02-16 LAB
A/G RATIO: 1.9 (ref 1.1–2.2)
ALBUMIN SERPL-MCNC: 4.6 G/DL (ref 3.4–5)
ALP BLD-CCNC: 63 U/L (ref 40–129)
ALT SERPL-CCNC: 13 U/L (ref 10–40)
ANION GAP SERPL CALCULATED.3IONS-SCNC: 16 MMOL/L (ref 3–16)
AST SERPL-CCNC: 20 U/L (ref 15–37)
BASOPHILS ABSOLUTE: 0 K/UL (ref 0–0.2)
BASOPHILS RELATIVE PERCENT: 0.8 %
BILIRUB SERPL-MCNC: 0.3 MG/DL (ref 0–1)
BUN BLDV-MCNC: 13 MG/DL (ref 7–20)
CALCIUM SERPL-MCNC: 9.8 MG/DL (ref 8.3–10.6)
CHLORIDE BLD-SCNC: 100 MMOL/L (ref 99–110)
CHOLESTEROL, TOTAL: 220 MG/DL (ref 0–199)
CO2: 23 MMOL/L (ref 21–32)
CREAT SERPL-MCNC: 0.6 MG/DL (ref 0.6–1.2)
EOSINOPHILS ABSOLUTE: 0 K/UL (ref 0–0.6)
EOSINOPHILS RELATIVE PERCENT: 1.3 %
GFR AFRICAN AMERICAN: >60
GFR NON-AFRICAN AMERICAN: >60
GLUCOSE BLD-MCNC: 88 MG/DL (ref 70–99)
HCT VFR BLD CALC: 39.5 % (ref 36–48)
HDLC SERPL-MCNC: 61 MG/DL (ref 40–60)
HEMOGLOBIN: 13.2 G/DL (ref 12–16)
LDL CHOLESTEROL CALCULATED: 145 MG/DL
LYMPHOCYTES ABSOLUTE: 1.1 K/UL (ref 1–5.1)
LYMPHOCYTES RELATIVE PERCENT: 38.5 %
MCH RBC QN AUTO: 28.7 PG (ref 26–34)
MCHC RBC AUTO-ENTMCNC: 33.3 G/DL (ref 31–36)
MCV RBC AUTO: 86.3 FL (ref 80–100)
MONOCYTES ABSOLUTE: 0.2 K/UL (ref 0–1.3)
MONOCYTES RELATIVE PERCENT: 7.8 %
NEUTROPHILS ABSOLUTE: 1.5 K/UL (ref 1.7–7.7)
NEUTROPHILS RELATIVE PERCENT: 51.6 %
PDW BLD-RTO: 13.3 % (ref 12.4–15.4)
PLATELET # BLD: 231 K/UL (ref 135–450)
PMV BLD AUTO: 8.9 FL (ref 5–10.5)
POTASSIUM SERPL-SCNC: 4.8 MMOL/L (ref 3.5–5.1)
RBC # BLD: 4.58 M/UL (ref 4–5.2)
SODIUM BLD-SCNC: 139 MMOL/L (ref 136–145)
TOTAL PROTEIN: 7 G/DL (ref 6.4–8.2)
TRIGL SERPL-MCNC: 70 MG/DL (ref 0–150)
VITAMIN D 25-HYDROXY: 52.6 NG/ML
VLDLC SERPL CALC-MCNC: 14 MG/DL
WBC # BLD: 2.8 K/UL (ref 4–11)

## 2022-02-16 PROCEDURE — 97110 THERAPEUTIC EXERCISES: CPT | Performed by: PHYSICAL THERAPY ASSISTANT

## 2022-02-16 PROCEDURE — 36415 COLL VENOUS BLD VENIPUNCTURE: CPT | Performed by: FAMILY MEDICINE

## 2022-02-16 PROCEDURE — 97140 MANUAL THERAPY 1/> REGIONS: CPT | Performed by: PHYSICAL THERAPY ASSISTANT

## 2022-02-16 PROCEDURE — 97112 NEUROMUSCULAR REEDUCATION: CPT | Performed by: PHYSICAL THERAPY ASSISTANT

## 2022-02-16 PROCEDURE — G8484 FLU IMMUNIZE NO ADMIN: HCPCS | Performed by: FAMILY MEDICINE

## 2022-02-16 PROCEDURE — G0438 PPPS, INITIAL VISIT: HCPCS | Performed by: FAMILY MEDICINE

## 2022-02-16 RX ORDER — IBANDRONATE SODIUM 150 MG/1
150 TABLET, FILM COATED ORAL
Qty: 3 TABLET | Refills: 0 | Status: SHIPPED | OUTPATIENT
Start: 2022-02-16 | End: 2022-06-20

## 2022-02-16 NOTE — PATIENT INSTRUCTIONS

## 2022-02-16 NOTE — FLOWSHEET NOTE
[facility], [address] [place], [address]  Phone: [phone], [phone]    Physical Therapy Treatment Note/ Progress Report:     Date:  2022    Patient Name:  Paxton Humphrey    :  1955  MRN: [id number]  Restrictions/Precautions:    Medical/Treatment Diagnosis Information:  · Diagnosis: L incomplete RTC tear and L impingement syndrome  · Treatment Diagnosis: S46.012D and M47.08  Insurance/Certification information:  PT Insurance Information: Medicare  Physician Information:  Referring Practitioner: Clary Mccall  Has the plan of care been signed (Y/N):        []  Yes  [x]  No     Date of Patient follow up with Physician: 2/15/2022    Is this a Progress Report:     []  Yes5 [x]  No      If Yes:  Date Range for reporting period:  Initial Eval: 2022  Beginnin2022 --- Endin2022     Progress report will be due (10 Rx or 30 days whichever is less):      Recertification will be due (POC Duration  / 90 days whichever is less): 2022       Visit # Insurance Allowable Auth Required   In Person [id number] []  Yes     []  No    Tele Health   []  Yes     []  No    Total 6         Functional Scale: Quick Dash: 36% (Total Number Sum: 27/55)   Date assessed: 2022     Latex Allergy:  [x]NO      []YES  Preferred Language for Healthcare:   [x]English       []other:    Pain level:  currently 0/10    SUBJECTIVE: Feels about 95% of normal.  Doing well with functional activities.     OBJECTIVE: See eval   Observation:    Test measurements:  Strength:  5/5 throughout with slight discomfort only testing flexion    Flexion 160, , IR T5, ER T3     RESTRICTIONS/PRECAUTIONS: Osteopenia    Exercises/Interventions:   Therapeutic Ex (17354)  Therapeutic Activity (40678)  NMR re-education (44408) Sets/Reps Notes/CUES   [place] NV 5'         T-band Row / Extension  Green x30 / Mayuri Gael x20    T-Band IR / ER  Green x20 / Green x20    Wall push up  x30 Ball On Wall 15 x ea ??, ??, CW/CCW        UT stretch 30\" x 3    Posterior capsule stretch 30\" x 3    Wall flexion stretch with ecc lower 5\" x20     Doorway ER stretch  10x10\"         Bilateral ER 5\" x 20red  Red TBand    Bilateral Hor ABD  5\" x20 yellow          Supine Cane ER 5\" x 20    Supine Cane flexion 5\" x 20    Supine punch  1# x20     Supine flexion  1# x20              Prone row 20x 3#   Prone I and Y ((T NV)) 20x ea    Prone Scap Squeeze          SL ER / SL ABD  20 x /x20                                                 Manual Intervention (06687)     Shoulder PROM- all directions 5'    Shoulder mobilizations- post/inf, post 5'                        Medbridge access code: [email or web address]  Access Code: [email or web address]  URL: [email or web address]  Date: 01/26/2022  Prepared by: Sergio Eastern    Exercises  Supine Shoulder External Rotation with Dowel - 2 x daily - 7 x weekly - 3 sets - 10 reps - 5 s hold  Supine Shoulder Flexion with Dowel - 2 x daily - 7 x weekly - 3 sets - 10 reps - 5 s hold  Shoulder Flexion Wall Slide with Towel - 2 x daily - 7 x weekly - 3 sets - 10 reps - 5 s hold  Shoulder External Rotation - 2 x daily - 7 x weekly - 3 sets - 10 reps - 5 s hold  Seated Scapular Retraction - 2 x daily - 7 x weekly - 3 sets - 10 reps  Standing Shoulder Shrugs - 2 x daily - 7 x weekly - 3 sets - 10 reps  Seated Neck Sidebending Stretch - 2 x daily - 7 x weekly - 3 sets - 30\" hold  Standing Shoulder Posterior Capsule Stretch - 2 x daily - 7 x weekly - 3 sets - 30 s hold  Prone Shoulder Row - 1 x daily - 7 x weekly - 3 sets - 10 reps  Prone Single Arm Shoulder Y - 1 x daily - 7 x weekly - 3 sets - 10 reps  Prone Shoulder Extension - Single Arm - 1 x daily - 7 x weekly - 3 sets - 10 reps                  Patient Education 10' Pt education with HEP and progression of PT along with compliance with HEP to aide with formal PT for optimal outcomes.       Therapeutic Exercise and NMR EXR  [x] (96066) Provided verbal/tactile cueing for activities related to strengthening, flexibility, endurance, ROM  for improvements in scapular, scapulothoracic and UE control with self care, reaching, carrying, lifting, house/yardwork, driving/computer work. [x] (92665) Provided verbal/tactile cueing for activities related to improving balance, coordination, kinesthetic sense, posture, motor skill, proprioception  to assist with  scapular, scapulothoracic and UE control with self care, reaching, carrying, lifting, house/yardwork, driving/computer work. Therapeutic Activities:    [x] (28296 or 41325) Provided verbal/tactile cueing for activities related to improving balance, coordination, kinesthetic sense, posture, motor skill, proprioception and motor activation to allow for proper function of scapular, scapulothoracic and UE control with self care, carrying, lifting, driving/computer work.      Home Exercise Program:    [x] (07769) Reviewed/Progressed HEP activities related to strengthening, flexibility, endurance, ROM of scapular, scapulothoracic and UE control with self care, reaching, carrying, lifting, house/yardwork, driving/computer work  [x] (44721) Reviewed/Progressed HEP activities related to improving balance, coordination, kinesthetic sense, posture, motor skill, proprioception of scapular, scapulothoracic and UE control with self care, reaching, carrying, lifting, house/yardwork, driving/computer work      Manual Treatments:  PROM / STM / Oscillations-Mobs:  G-I, II, III, IV (PA's, Inf., Post.)  [x] (56943) Provided manual therapy to mobilize soft tissue/joints of cervical/CT, scapular GHJ and UE for the purpose of modulating pain, promoting relaxation,  increasing ROM, reducing/eliminating soft tissue swelling/inflammation/restriction, improving soft tissue extensibility and allowing for proper ROM for normal function with self care, reaching, carrying, lifting, house/yardwork, driving/computer work    Modalities: Charges:  Timed Code Treatment Minutes: 54'   Total Treatment Minutes:  54'   [address] [facility]:  [facility] TIME:  MANUAL TIME:  UNTIMED MINUTES:  Medicare Total:         V# 6  $690      [] EVAL (LOW) 31964 (typically 20 minutes face-to-face) x 1  [] EVAL (MOD) 79685 (typically 30 minutes face-to-face)  [] EVAL (HIGH) 65071 (typically 45 minutes face-to-face)  [] RE-EVAL     [x] XM(85110) x 2    [] IONTO  [x] NMR (25817) 1    [] VASO  [x] Manual (88654) x 1     [] Other:  [] TA x      [] Mech Traction (66016)  [] ES(attended) (55504)     [] ES (un) (50852):    ASSESSMENT:  See eval    GOALS:   Short Term Goals: To be achieved in: 2 weeks  1. Independent in HEP and progression per patient tolerance, in order to prevent re-injury. [] Progressing: [x] Met: [] Not Met: [] Adjusted   2. Patient will have a decrease in pain to facilitate improvement in movement, function, and ADLs as indicated by Functional Deficits. [] Progressing: [x] Met: [] Not Met: [] Adjusted     Long Term Goals: To be achieved in: 12 weeks  1. Disability index score of 20% or less for the QuickDash/Oswestry to assist with reaching prior level of function. [] Progressing: [x] Met: [] Not Met: [] Adjusted   2. Patient will demonstrate increased left AROM to equal the opposite side bilaterally to allow for ability to reach overhead without pain or restriction as indicated by patients Functional Deficits. [] Progressing: [x] Met: [] Not Met: [] Adjusted   3. Patient will demonstrate an increase in strength in left UE to 4+/5 to allow for ability to lift objects overhead as indicated by patients Functional Deficits. [] Progressing: [x] Met: [] Not Met: [] Adjusted   4. Patient will return to all transfers, work activities, and functional activities without increased symptoms or restriction. [] Progressing: [x] Met: [] Not Met: [] Adjusted   5. Patient will have 0/10 pain with ADL's.  [] Progressing: [x] Met: [] Not Met: [] Adjusted   6.  Patient stated goal: Return to working out pain and restriction free. [] Progressing: [x] Met: [] Not Met: [] Adjusted    Overall Progression Towards Functional goals/ Treatment Progress Update:  [x] Patient is progressing as expected towards functional goals listed. [] Progression is slowed due to complexities/Impairments listed. [] Progression has been slowed due to co-morbidities. [] Plan just implemented, too soon to assess goals progression <30days   [] Goals require adjustment due to lack of progress  [] Patient is not progressing as expected and requires additional follow up with physician  [] Other    Prognosis for POC: [x] Good [] Fair  [] Poor    Patient requires continued skilled intervention: [x] Yes  [] No    Treatment/Activity Tolerance:  [x] Patient able to complete treatment  [] Patient limited by fatigue  [] Patient limited by pain    [] Patient limited by other medical complications  [] Other:     Return to Play: (if applicable)   []  Stage 1: Intro to Strength   []  Stage 2: Return to Run and Strength   []  Stage 3: Return to Jump and Strength   []  Stage 4: Dynamic Strength and Agility   []  Stage 5: Sport Specific Training     []  Ready to Return to Play, Meets All Above Stages   []  Not Ready for Return to Sports   Comments:                         PLAN: See eval  [] Continue per plan of care [] Alter current plan (see comments above)  [] Plan of care initiated [] Hold pending MD visit [x] Discharge    Electronically signed by:  Dinorah Saleh PTA    Note: If patient does not return for scheduled/ recommended follow up visits, this note will serve as a discharge from care along with most recent update on progress.

## 2022-02-16 NOTE — PROGRESS NOTES
Chief Complaint   Patient presents with    Annual Exam        Internal Administration   First Dose COVID-19, Lorraine Shultz, Primary or Immunocompromised, PF, 100mcg/0.5mL  2021   Second Dose COVID-19, Lorraine Shultz, Primary or Immunocompromised, PF, 100mcg/0.5mL   2021       Last COVID Lab No results found for: SARS-COV-2, SARS-COV-2 RNA, SARS-COV-2, SARS-COV-2, SARS-COV-2 BY PCR, SARS-COV-2, SARS-COV-2, SARS-COV-2          Wt Readings from Last 3 Encounters:   22 138 lb (62.6 kg)   21 139 lb 9.6 oz (63.3 kg)   21 141 lb (64 kg)     BP Readings from Last 3 Encounters:   22 (!) 142/88   21 128/76   21 118/82      Lab Results   Component Value Date    LABA1C 5.2 2018       HPI:  Shari Pastrana is a 77 y.o. (: 1955) here today for    Follow up on chronic conditions. States she has a partial tear in her left rotator cuff. BP was elevated in office today. She will monitor her BP at home and call with readings. [x] Patient has completed an advance directive  [] Patient has NOT completed an advanced directive  [x] Patient has a documented healthcare surrogate  [] Patient does NOT have a documented healthcare surrogate  [] Discussed the importance of establishing and updating an advanced directive. Patient has questions at this time and those were answered. [x] Discussed the importance of establishing and updating an advanced directive. Patient does NOT have questions at this time. Discussed with: [x] Patient            [] Family             [] Other caregiver    Patient's medications, allergies, past medical, surgical, social and family histories were reviewed and updated asappropriate on 2022 at 7:44 AM.    ROS:  Review of Systems    All other systems reviewed and are negative except as noted above on 2022 at 7:44 AM. Additional review of systems may be scanned into the media section ofthis medical record.   Any responses requiring further intervention were pursued. Past Medical History:   Diagnosis Date    Allergic rhinitis     Osteoarthritis     Osteoporosis     Pulmonary emboli ([address] 07/2018     Family History   Problem Relation Age of Onset    Cancer Mother 79        breast, NMSC    High Blood Pressure Mother     Breast Cancer Mother         in her 66's    Cancer Father 61        colon ca, prostate    High Blood Pressure Father     Cancer Sister 36        breast    Hypertension Brother     Prostate Cancer Brother     Atrial Fibrillation Brother     Hypertension Brother     Breast Cancer Maternal Aunt         in her 42's     Social History     Socioeconomic History    Marital status:      Spouse name: Not on file    Number of children: 3    Years of education: Not on file    Highest education level: Not on file   Occupational History    Occupation: nurse   Tobacco Use    Smoking status: Never Smoker    Smokeless tobacco: Never Used   Vaping Use    Vaping Use: Never used   Substance and Sexual Activity    Alcohol use: Yes     Comment: rarely    Drug use: No    Sexual activity: Not Currently     Partners: Male   Other Topics Concern    Not on file   Social History Narrative    Not on file     Social Determinants of Health     Financial Resource Strain: Low Risk     Difficulty of Paying Living Expenses: Not hard at all   Food Insecurity: No Food Insecurity    Worried About [address] in the Last Year: Never true    [facility] in the Last Year: Never true   Transportation Needs:     Lack of Transportation (Medical): Not on file    Lack of Transportation (Non-Medical):  Not on file   Physical Activity:     Days of Exercise per Week: Not on file    Minutes of Exercise per Session: Not on file   Stress:     Feeling of Stress : Not on file   Social Connections:     Frequency of Communication with Friends and Family: Not on file    Frequency of Social Gatherings with Friends and Family: Not on file    Attends Roman [employer] Services: Not on file    Active Member of Clubs or Organizations: Not on file    Attends Club or Organization Meetings: Not on file    Marital Status: Not on file   Intimate Partner Violence:     Fear of Current or Ex-Partner: Not on file    Emotionally Abused: Not on file    Physically Abused: Not on file    Sexually Abused: Not on file   Housing Stability:     Unable to Pay for Housing in the Last Year: Not on file    Number of [place] in the Last Year: Not on file    Unstable Housing in the Last Year: Not on file     Prior to Visit Medications    Medication Sig Taking? Authorizing Provider   pseudoephedrine (SUDAFED) 60 MG tablet Take 1 tablet by mouth 2 times daily Yes Valentino Pena MD   celecoxib (CELEBREX) 200 MG capsule TAKE ONE CAPSULE BY MOUTH TWO TIMES DAILY AS NEEDED Yes Valentino Pena MD   ibandronate (BONIVA) 150 MG tablet Take 1 tablet by mouth every 30 days Take one (1) tablet once per month in the morning with a full glass of water, on an empty stomach, and do not take anything else by mouth or lie down for the next 30 minutes. Yes Valentino Pena MD   apixaban (ELIQUIS) 5 MG TABS tablet Take 1 tablet by mouth 2 times daily Yes Valentino Pena MD   famciclovir RIVER [facility]) 500 MG tablet Take 2 tablets twice a day per fever blister outbreak Yes Valentino Pena MD   DiphenhydrAMINE HCl (BENADRYL ALLERGY PO) Take 25 mg by mouth nightly Yes Historical Provider, MD   Cholecalciferol (VITAMIN D) 2000 UNITS CAPS capsule Take 2,000 Units by mouth Yes Historical Provider, MD   Calcium Carbonate-Vitamin D (CALCIUM-VITAMIN D) 500-200 MG-UNIT per tablet Take 1 tablet by mouth 2 times daily (with meals). Yes Historical Provider, MD   Glucosamine-Chondroit-Vit C-Mn (GLUCOSAMINE 1500 COMPLEX) CAPS Take 2 tablets by mouth daily.    Yes Historical Provider, MD   therapeutic multivitamin-minerals (THERAGRAN-M) tablet Take 1 tablet by mouth daily. Yes Historical Provider, MD     Allergies   Allergen Reactions    Erythromycin Nausea Only       OBJECTIVE:  Estimated body mass index is 21.94 kg/m² as calculated from the following:    Height as of 2/8/21: 5' 6.5\" (1.689 m). Weight as of this encounter: 138 lb (62.6 kg). Vitals:    02/16/22 0739   BP: (!) 142/88   Site: Right Upper Arm   Position: Sitting   Cuff Size: Medium Adult   Weight: 138 lb (62.6 kg)       Physical Exam  Vitals and nursing note reviewed. Constitutional:       General: She is not in acute distress. Appearance: She is well-developed. She is not diaphoretic. HENT:      Head: Normocephalic and atraumatic. Right Ear: External ear normal.      Left Ear: External ear normal.      Nose: Nose normal.   Eyes:      General: Lids are normal. No scleral icterus. Right eye: No discharge. Left eye: No discharge. Conjunctiva/sclera: Conjunctivae normal.      Pupils: Pupils are equal, round, and reactive to light. Neck:      Thyroid: No thyromegaly. Vascular: No JVD. Trachea: No tracheal deviation. Cardiovascular:      Rate and Rhythm: Normal rate and regular rhythm. Heart sounds: Normal heart sounds. No murmur heard. No friction rub. No gallop. Pulmonary:      Effort: Pulmonary effort is normal. No respiratory distress. Breath sounds: Normal breath sounds. No stridor. No wheezing or rales. Chest:      Chest wall: No tenderness. Breasts:      Right: No supraclavicular adenopathy. Left: No supraclavicular adenopathy. Abdominal:      General: Bowel sounds are normal. There is no distension. Palpations: Abdomen is soft. There is no mass. Tenderness: There is no abdominal tenderness. There is no guarding. Musculoskeletal:         General: No tenderness. Normal range of motion. Cervical back: Normal range of motion and neck supple.    Lymphadenopathy:      Head:      Right side of head: No submental, submandibular, tonsillar, preauricular, posterior auricular or occipital adenopathy. Left side of head: No submental, submandibular, tonsillar, preauricular, posterior auricular or occipital adenopathy. Cervical: No cervical adenopathy. Upper Body:      Right upper body: No supraclavicular or epitrochlear adenopathy. Left upper body: No supraclavicular or epitrochlear adenopathy. Skin:     General: Skin is warm and dry. Coloration: Skin is not pale. Findings: No erythema or rash. Nails: There is no clubbing. Comments: Turgor normal   Neurological:      Cranial Nerves: No cranial nerve deficit. Motor: No abnormal muscle tone. Coordination: Coordination normal.      Deep Tendon Reflexes:      Reflex Scores:       Patellar reflexes are 2+ on the right side and 2+ on the left side. Psychiatric:         Behavior: Behavior normal.         Thought Content: Thought content normal.         Judgment: Judgment normal.      Comments: Alert and oriented x 3              ASSESSMENT PLAN      Diagnosis Orders   1. Annual physical exam  Comprehensive Metabolic Panel   2. Acute deep vein thrombosis (DVT) of other specified vein of both lower extremities (HCC)  ibandronate (BONIVA) 150 MG tablet    CBC with Auto Differential   3. Screening cholesterol level  Lipid Panel   4. Vitamin D deficiency  Vitamin D 25 Hydroxy   5. Osteopenia, unspecified location     6. Elevated BP without diagnosis of hypertension     Patient continues to do very well. No recurrence of DVT no recurrence of PE by symptoms. On lifetime anticoagulation. Update labs. Vitamin D levels will be monitored as needed and changes to medications related to vitamin D as listed in the medication list will be changed to maintain parameters as needed. Boniva refilled. She will get a machine to check her blood pressure at home. Follow-up in a year.     Patient should call the office immediately with new or ongoing signs or symptoms or worsening, or proceed to the emergency room. No changes in past medical history, past surgical history, social history, or family history were noted during the patient encounter unless specifically listed above. All updates of past medical history, past surgical history, social history, or family history were reviewed personally by me during the office visit. All problems listed in the assessment are stable unless noted otherwise. Medication profile reviewed personally by me during the visit. Medication side effects and possible impairments from medications were discussed as applicable. This document was prepared by a combination of typing and transcription through a voice recognition software. Scribe attestation: Fatou STAPLETON, deirdre scribing for and in the presence of Juan Jaquez MD. Electronically signed by Emery Manning on 2/16/2022 at 7:44 AM      Provider attestation:     I, Dr. Skyler Owen, personally performed the services described in this documentation, as scribed by the above signed scribe in my presence, and it is both accurate and complete. I agree with the ROS and Past Histories independently gathered by the clinical support staff and the remaining scribed note accurately describes my personal service to the patient.       2/16/2022    8:00 AM

## 2022-02-22 ENCOUNTER — PATIENT MESSAGE (OUTPATIENT)
Dept: FAMILY MEDICINE CLINIC | Age: 67
End: 2022-02-22

## 2022-02-22 DIAGNOSIS — R03.0 ELEVATED BP WITHOUT DIAGNOSIS OF HYPERTENSION: Primary | ICD-10-CM

## 2022-02-22 DIAGNOSIS — D70.9 GRANULOCYTOPENIA (HCC): ICD-10-CM

## 2022-02-22 RX ORDER — ROSUVASTATIN CALCIUM 5 MG/1
5 TABLET, COATED ORAL DAILY
Qty: 30 TABLET | Refills: 0 | Status: SHIPPED | OUTPATIENT
Start: 2022-02-22 | End: 2022-03-07

## 2022-02-22 NOTE — TELEPHONE ENCOUNTER
Begin rosuvastatin 5 mg daily. Lipid liver CPK 6 weeks.   Order the CBC for 3 weeks for granulocytopenia

## 2022-02-22 NOTE — TELEPHONE ENCOUNTER
From: Des Garibay  To: Dr. Twila Curtis: 2/22/2022 2:56 PM EST  Subject: elevated cardiac risk score    I assume part of this is due to the elevated blood pressure readings in the office and my advancing age. I am willing to take a statin medication, please send this to [address] [place] [address]. Also will recheck my CBC in about 3 weeks if someone will place the order. I will call for a lab appointment.  Thanks, Eunice Donaldson

## 2022-03-07 RX ORDER — ROSUVASTATIN CALCIUM 5 MG/1
5 TABLET, COATED ORAL DAILY
Qty: 30 TABLET | Refills: 0 | Status: SHIPPED | OUTPATIENT
Start: 2022-03-07 | End: 2022-04-11 | Stop reason: SDUPTHER

## 2022-03-10 ENCOUNTER — NURSE ONLY (OUTPATIENT)
Dept: FAMILY MEDICINE CLINIC | Age: 67
End: 2022-03-10
Payer: MEDICARE

## 2022-03-10 DIAGNOSIS — R79.9 ABNORMAL FINDING OF BLOOD CHEMISTRY, UNSPECIFIED: ICD-10-CM

## 2022-03-10 DIAGNOSIS — R03.0 ELEVATED BP WITHOUT DIAGNOSIS OF HYPERTENSION: ICD-10-CM

## 2022-03-10 DIAGNOSIS — D70.9 GRANULOCYTOPENIA (HCC): ICD-10-CM

## 2022-03-10 DIAGNOSIS — R03.0 ELEVATED BP WITHOUT DIAGNOSIS OF HYPERTENSION: Primary | ICD-10-CM

## 2022-03-10 DIAGNOSIS — Z13.220 SCREENING CHOLESTEROL LEVEL: ICD-10-CM

## 2022-03-10 LAB
BASOPHILS ABSOLUTE: 0 K/UL (ref 0–0.2)
BASOPHILS RELATIVE PERCENT: 0.4 %
EOSINOPHILS ABSOLUTE: 0.1 K/UL (ref 0–0.6)
EOSINOPHILS RELATIVE PERCENT: 1.2 %
HCT VFR BLD CALC: 38.4 % (ref 36–48)
HEMOGLOBIN: 12.6 G/DL (ref 12–16)
LYMPHOCYTES ABSOLUTE: 1 K/UL (ref 1–5.1)
LYMPHOCYTES RELATIVE PERCENT: 23.3 %
MCH RBC QN AUTO: 28.7 PG (ref 26–34)
MCHC RBC AUTO-ENTMCNC: 32.7 G/DL (ref 31–36)
MCV RBC AUTO: 87.8 FL (ref 80–100)
MONOCYTES ABSOLUTE: 0.3 K/UL (ref 0–1.3)
MONOCYTES RELATIVE PERCENT: 7.1 %
NEUTROPHILS ABSOLUTE: 3 K/UL (ref 1.7–7.7)
NEUTROPHILS RELATIVE PERCENT: 68 %
PDW BLD-RTO: 13.8 % (ref 12.4–15.4)
PLATELET # BLD: 212 K/UL (ref 135–450)
PMV BLD AUTO: 9.2 FL (ref 5–10.5)
RBC # BLD: 4.37 M/UL (ref 4–5.2)
WBC # BLD: 4.4 K/UL (ref 4–11)

## 2022-03-10 PROCEDURE — 36415 COLL VENOUS BLD VENIPUNCTURE: CPT | Performed by: FAMILY MEDICINE

## 2022-04-07 ENCOUNTER — NURSE ONLY (OUTPATIENT)
Dept: FAMILY MEDICINE CLINIC | Age: 67
End: 2022-04-07
Payer: MEDICARE

## 2022-04-07 DIAGNOSIS — R03.0 ELEVATED BP WITHOUT DIAGNOSIS OF HYPERTENSION: ICD-10-CM

## 2022-04-07 DIAGNOSIS — R79.9 ABNORMAL FINDING OF BLOOD CHEMISTRY, UNSPECIFIED: ICD-10-CM

## 2022-04-07 LAB
ALBUMIN SERPL-MCNC: 4.4 G/DL (ref 3.4–5)
ALP BLD-CCNC: 56 U/L (ref 40–129)
ALT SERPL-CCNC: 12 U/L (ref 10–40)
AST SERPL-CCNC: 16 U/L (ref 15–37)
BILIRUB SERPL-MCNC: 0.4 MG/DL (ref 0–1)
BILIRUBIN DIRECT: <0.2 MG/DL (ref 0–0.3)
BILIRUBIN, INDIRECT: NORMAL MG/DL (ref 0–1)
CHOLESTEROL, TOTAL: 194 MG/DL (ref 0–199)
HDLC SERPL-MCNC: 78 MG/DL (ref 40–60)
LDL CHOLESTEROL CALCULATED: 102 MG/DL
TOTAL CK: 52 U/L (ref 26–192)
TOTAL PROTEIN: 6.6 G/DL (ref 6.4–8.2)
TRIGL SERPL-MCNC: 68 MG/DL (ref 0–150)
VLDLC SERPL CALC-MCNC: 14 MG/DL

## 2022-04-07 PROCEDURE — 36415 COLL VENOUS BLD VENIPUNCTURE: CPT | Performed by: FAMILY MEDICINE

## 2022-04-11 RX ORDER — ROSUVASTATIN CALCIUM 5 MG/1
5 TABLET, COATED ORAL DAILY
Qty: 90 TABLET | Refills: 3 | Status: SHIPPED | OUTPATIENT
Start: 2022-04-11

## 2022-06-17 DIAGNOSIS — I82.493 ACUTE DEEP VEIN THROMBOSIS (DVT) OF OTHER SPECIFIED VEIN OF BOTH LOWER EXTREMITIES (HCC): ICD-10-CM

## 2022-06-20 RX ORDER — IBANDRONATE SODIUM 150 MG/1
TABLET, FILM COATED ORAL
Qty: 3 TABLET | Refills: 5 | Status: SHIPPED | OUTPATIENT
Start: 2022-06-20

## 2022-06-30 ENCOUNTER — HOSPITAL ENCOUNTER (OUTPATIENT)
Dept: MAMMOGRAPHY | Age: 67
Discharge: HOME | End: 2022-06-30
Payer: MEDICARE

## 2022-06-30 ENCOUNTER — TELEPHONE (OUTPATIENT)
Dept: MAMMOGRAPHY | Age: 67
End: 2022-06-30

## 2022-06-30 DIAGNOSIS — Z12.31 SCREENING MAMMOGRAM, ENCOUNTER FOR: ICD-10-CM

## 2022-06-30 PROCEDURE — 77063 BREAST TOMOSYNTHESIS BI: CPT

## 2022-09-21 RX ORDER — APIXABAN 5 MG/1
TABLET, FILM COATED ORAL
Qty: 180 TABLET | Refills: 3 | Status: SHIPPED | OUTPATIENT
Start: 2022-09-21

## 2022-12-07 DIAGNOSIS — M19.90 ARTHRITIS: ICD-10-CM

## 2022-12-07 RX ORDER — CELECOXIB 200 MG/1
CAPSULE ORAL
Qty: 180 CAPSULE | Refills: 3 | Status: SHIPPED | OUTPATIENT
Start: 2022-12-07

## 2023-02-12 SDOH — HEALTH STABILITY: PHYSICAL HEALTH: ON AVERAGE, HOW MANY MINUTES DO YOU ENGAGE IN EXERCISE AT THIS LEVEL?: 40 MIN

## 2023-02-12 SDOH — HEALTH STABILITY: PHYSICAL HEALTH: ON AVERAGE, HOW MANY DAYS PER WEEK DO YOU ENGAGE IN MODERATE TO STRENUOUS EXERCISE (LIKE A BRISK WALK)?: 6 DAYS

## 2023-02-12 ASSESSMENT — PATIENT HEALTH QUESTIONNAIRE - PHQ9
SUM OF ALL RESPONSES TO PHQ9 QUESTIONS 1 & 2: 0
SUM OF ALL RESPONSES TO PHQ QUESTIONS 1-9: 0
SUM OF ALL RESPONSES TO PHQ QUESTIONS 1-9: 0
1. LITTLE INTEREST OR PLEASURE IN DOING THINGS: 0
SUM OF ALL RESPONSES TO PHQ QUESTIONS 1-9: 0
SUM OF ALL RESPONSES TO PHQ QUESTIONS 1-9: 0
2. FEELING DOWN, DEPRESSED OR HOPELESS: 0

## 2023-02-12 ASSESSMENT — LIFESTYLE VARIABLES
HOW MANY STANDARD DRINKS CONTAINING ALCOHOL DO YOU HAVE ON A TYPICAL DAY: 1
HOW OFTEN DO YOU HAVE A DRINK CONTAINING ALCOHOL: MONTHLY OR LESS
HOW OFTEN DO YOU HAVE SIX OR MORE DRINKS ON ONE OCCASION: 1
HOW OFTEN DO YOU HAVE A DRINK CONTAINING ALCOHOL: 2
HOW MANY STANDARD DRINKS CONTAINING ALCOHOL DO YOU HAVE ON A TYPICAL DAY: 1 OR 2

## 2023-02-21 ENCOUNTER — TELEMEDICINE (OUTPATIENT)
Dept: FAMILY MEDICINE CLINIC | Age: 68
End: 2023-02-21
Payer: MEDICARE

## 2023-02-21 DIAGNOSIS — Z00.00 MEDICARE ANNUAL WELLNESS VISIT, SUBSEQUENT: Primary | ICD-10-CM

## 2023-02-21 PROCEDURE — 1123F ACP DISCUSS/DSCN MKR DOCD: CPT | Performed by: FAMILY MEDICINE

## 2023-02-21 PROCEDURE — G0439 PPPS, SUBSEQ VISIT: HCPCS | Performed by: FAMILY MEDICINE

## 2023-02-21 PROCEDURE — 3017F COLORECTAL CA SCREEN DOC REV: CPT | Performed by: FAMILY MEDICINE

## 2023-02-21 PROCEDURE — G8484 FLU IMMUNIZE NO ADMIN: HCPCS | Performed by: FAMILY MEDICINE

## 2023-02-21 SDOH — ECONOMIC STABILITY: FOOD INSECURITY: WITHIN THE PAST 12 MONTHS, YOU WORRIED THAT YOUR FOOD WOULD RUN OUT BEFORE YOU GOT MONEY TO BUY MORE.: NEVER TRUE

## 2023-02-21 SDOH — ECONOMIC STABILITY: INCOME INSECURITY: HOW HARD IS IT FOR YOU TO PAY FOR THE VERY BASICS LIKE FOOD, HOUSING, MEDICAL CARE, AND HEATING?: NOT HARD AT ALL

## 2023-02-21 SDOH — ECONOMIC STABILITY: FOOD INSECURITY: WITHIN THE PAST 12 MONTHS, THE FOOD YOU BOUGHT JUST DIDN'T LAST AND YOU DIDN'T HAVE MONEY TO GET MORE.: NEVER TRUE

## 2023-02-21 SDOH — ECONOMIC STABILITY: TRANSPORTATION INSECURITY
IN THE PAST 12 MONTHS, HAS LACK OF TRANSPORTATION KEPT YOU FROM MEETINGS, WORK, OR FROM GETTING THINGS NEEDED FOR DAILY LIVING?: NO

## 2023-02-21 SDOH — ECONOMIC STABILITY: HOUSING INSECURITY
IN THE LAST 12 MONTHS, WAS THERE A TIME WHEN YOU DID NOT HAVE A STEADY PLACE TO SLEEP OR SLEPT IN A SHELTER (INCLUDING NOW)?: NO

## 2023-02-21 ASSESSMENT — PATIENT HEALTH QUESTIONNAIRE - PHQ9
SUM OF ALL RESPONSES TO PHQ QUESTIONS 1-9: 0
SUM OF ALL RESPONSES TO PHQ QUESTIONS 1-9: 0
SUM OF ALL RESPONSES TO PHQ9 QUESTIONS 1 & 2: 0
SUM OF ALL RESPONSES TO PHQ QUESTIONS 1-9: 0
2. FEELING DOWN, DEPRESSED OR HOPELESS: 0
1. LITTLE INTEREST OR PLEASURE IN DOING THINGS: 0
SUM OF ALL RESPONSES TO PHQ QUESTIONS 1-9: 0

## 2023-02-21 ASSESSMENT — LIFESTYLE VARIABLES
HOW OFTEN DO YOU HAVE A DRINK CONTAINING ALCOHOL: MONTHLY OR LESS
HOW MANY STANDARD DRINKS CONTAINING ALCOHOL DO YOU HAVE ON A TYPICAL DAY: 1 OR 2

## 2023-02-21 NOTE — PATIENT INSTRUCTIONS
Personalized Preventive Plan for Ree Reyes - 2/21/2023  Medicare offers a range of preventive health benefits. Some of the tests and screenings are paid in full while other may be subject to a deductible, co-insurance, and/or copay. Some of these benefits include a comprehensive review of your medical history including lifestyle, illnesses that may run in your family, and various assessments and screenings as appropriate. After reviewing your medical record and screening and assessments performed today your provider may have ordered immunizations, labs, imaging, and/or referrals for you. A list of these orders (if applicable) as well as your Preventive Care list are included within your After Visit Summary for your review. Other Preventive Recommendations:    A preventive eye exam performed by an eye specialist is recommended every 1-2 years to screen for glaucoma; cataracts, macular degeneration, and other eye disorders. A preventive dental visit is recommended every 6 months. Try to get at least 150 minutes of exercise per week or 10,000 steps per day on a pedometer . Order or download the FREE \"Exercise & Physical Activity: Your Everyday Guide\" from [employer]. Call 0-352.534.3289 or search [employer] online. You need 6158-3452 mg of calcium and 1138-8426 IU of vitamin D per day. It is possible to meet your calcium requirement with diet alone, but a vitamin D supplement is usually necessary to meet this goal.  When exposed to the sun, use a sunscreen that protects against both UVA and UVB radiation with an SPF of 30 or greater. Reapply every 2 to 3 hours or after sweating, drying off with a towel, or swimming. Always wear a seat belt when traveling in a car. Always wear a helmet when riding a bicycle or motorcycle.

## 2023-02-21 NOTE — PROGRESS NOTES
Medicare Annual Wellness Visit    Fany Carmona is here for Medicare AWV    Assessment & Plan   Medicare annual wellness visit, subsequent      Recommendations for Preventive Services Due: see orders and patient instructions/AVS.  Recommended screening schedule for the next 5-10 years is provided to the patient in written form: see Patient Instructions/AVS.     No follow-ups on file. Subjective       Patient's complete Health Risk Assessment and screening values have been reviewed and are found in Flowsheets. The following problems were reviewed today and where indicated follow up appointments were made and/or referrals ordered. Positive Risk Factor Screenings with Interventions:    Fall Risk:  Do you feel unsteady or are you worried about falling? : no  2 or more falls in past year?: no  Fall with injury in past year?: (!) yes     Interventions:    Patient declines any further evaluation or treatment                  Safety:  Do you have any tripping hazards - loose or unsecured carpets or rugs?: (!) Yes (Patient stated when she falls or trips over the rugs she has that is when she will remove them but not at this time.)  Interventions:  Patient declined any further interventions or treatment               Objective      Patient-Reported Vitals  Patient-Reported Systolic (Top): 016 mmHg  Patient-Reported Diastolic (Bottom): 74 mmHg  Patient-Reported Pulse: 60  Patient-Reported Weight: 137 lbs            Allergies   Allergen Reactions    Erythromycin Nausea Only     Prior to Visit Medications    Medication Sig Taking?  Authorizing Provider   celecoxib (CELEBREX) 200 MG capsule TAKE ONE CAPSULE BY MOUTH TWO TIMES DAILY AS NEEDED Yes Edelmira Butt MD   ELIQUIS 5 MG TABS tablet TAKE 1 TABLET TWICE A DAY Yes Edelmira Butt MD   ibandronate (BONIVA) 150 MG tablet Take 1 tablet by mouth every 30 days Take one (1) tablet once per month in the morning with a full glass of water, on an empty stomach, and do not take anything else by mouth or lie down for the Yes Matty Pedro MD   rosuvastatin (CRESTOR) 5 MG tablet Take 1 tablet by mouth daily Yes Matty Pedro MD   pseudoephedrine (SUDAFED) 60 MG tablet Take 1 tablet by mouth 2 times daily Yes Matty Pedro MD   famciclovir (FAMVIR) 500 MG tablet Take 2 tablets twice a day per fever blister outbreak Yes Matty Pedro MD   DiphenhydrAMINE HCl (BENADRYL ALLERGY PO) Take 25 mg by mouth nightly Yes Historical Provider, MD   Cholecalciferol (VITAMIN D) 2000 UNITS CAPS capsule Take 2,000 Units by mouth Yes Historical Provider, MD   Calcium Carbonate-Vitamin D (CALCIUM-VITAMIN D) 500-200 MG-UNIT per tablet Take 1 tablet by mouth nightly Yes Historical Provider, MD   therapeutic multivitamin-minerals (THERAGRAN-M) tablet Take 1 tablet by mouth daily.   Yes Historical Provider, MD   Glucosamine-Chondroit-Vit C-Mn (GLUCOSAMINE 1500 COMPLEX) CAPS Take 2 tablets by mouth daily.    Historical Provider, MD Barrios (Including outside providers/suppliers regularly involved in providing care):   Patient Care Team:  Matty Pedro MD as PCP - General  Matty Pedro MD as PCP - Empaneled Provider  Major Mir MD as Consulting Physician (Otolaryngology)     Reviewed and updated this visit:  Tobacco  Allergies  Meds  Med Hx  Surg Hx  Soc Hx  Fam Hx        I, Chetna Raya LPN, 2/21/2023, performed the documented evaluation under the direct supervision of the attending physician.  Shayla Caal, was evaluated through a synchronous (real-time) telephone encounter. The patient (or guardian if applicable) is aware that this is a billable service, which includes applicable co-pays. This Virtual Visit was conducted with patient's (and/or legal guardian's) consent. The visit was conducted pursuant to the emergency declaration under the [place] Act and the National Emergencies Act, 1135  waiver authority and the [employer] and [employer] General Act. Patient identification was verified, and a caregiver was present when appropriate. The patient was located at Home: [address] [place] [address]  Provider was located at [facility] (Appt Dept): [address],  [address]     This encounter was performed under my, Erna Longo, direct supervision, 2/21/2023.      Vikram Osei LPN

## 2023-02-22 ENCOUNTER — OFFICE VISIT (OUTPATIENT)
Dept: FAMILY MEDICINE CLINIC | Age: 68
End: 2023-02-22

## 2023-02-22 VITALS
DIASTOLIC BLOOD PRESSURE: 74 MMHG | HEART RATE: 60 BPM | OXYGEN SATURATION: 93 % | WEIGHT: 139 LBS | BODY MASS INDEX: 22.1 KG/M2 | SYSTOLIC BLOOD PRESSURE: 124 MMHG

## 2023-02-22 DIAGNOSIS — Z00.00 ANNUAL PHYSICAL EXAM: Primary | ICD-10-CM

## 2023-02-22 DIAGNOSIS — M85.80 OSTEOPENIA, UNSPECIFIED LOCATION: ICD-10-CM

## 2023-02-22 DIAGNOSIS — R73.9 HYPERGLYCEMIA: ICD-10-CM

## 2023-02-22 DIAGNOSIS — Z13.29 SCREENING FOR THYROID DISORDER: ICD-10-CM

## 2023-02-22 DIAGNOSIS — Z13.220 SCREENING FOR LIPID DISORDERS: ICD-10-CM

## 2023-02-22 PROBLEM — I82.409 DEEP VENOUS THROMBOSIS (HCC): Status: RESOLVED | Noted: 2018-07-09 | Resolved: 2023-02-22

## 2023-02-22 LAB
A/G RATIO: 1.7 (ref 1.1–2.2)
ALBUMIN SERPL-MCNC: 4.5 G/DL (ref 3.4–5)
ALP BLD-CCNC: 49 U/L (ref 40–129)
ALT SERPL-CCNC: 12 U/L (ref 10–40)
ANION GAP SERPL CALCULATED.3IONS-SCNC: 11 MMOL/L (ref 3–16)
AST SERPL-CCNC: 20 U/L (ref 15–37)
BASOPHILS ABSOLUTE: 0 K/UL (ref 0–0.2)
BASOPHILS RELATIVE PERCENT: 0.7 %
BILIRUB SERPL-MCNC: 0.3 MG/DL (ref 0–1)
BUN BLDV-MCNC: 12 MG/DL (ref 7–20)
CALCIUM SERPL-MCNC: 10 MG/DL (ref 8.3–10.6)
CHLORIDE BLD-SCNC: 103 MMOL/L (ref 99–110)
CHOLESTEROL, TOTAL: 178 MG/DL (ref 0–199)
CO2: 27 MMOL/L (ref 21–32)
CREAT SERPL-MCNC: 0.6 MG/DL (ref 0.6–1.2)
EOSINOPHILS ABSOLUTE: 0.1 K/UL (ref 0–0.6)
EOSINOPHILS RELATIVE PERCENT: 2.2 %
GFR SERPL CREATININE-BSD FRML MDRD: >60 ML/MIN/{1.73_M2}
GLUCOSE BLD-MCNC: 88 MG/DL (ref 70–99)
HCT VFR BLD CALC: 40 % (ref 36–48)
HDLC SERPL-MCNC: 71 MG/DL (ref 40–60)
HEMOGLOBIN: 13.4 G/DL (ref 12–16)
LDL CHOLESTEROL CALCULATED: 90 MG/DL
LYMPHOCYTES ABSOLUTE: 1.1 K/UL (ref 1–5.1)
LYMPHOCYTES RELATIVE PERCENT: 35.5 %
MCH RBC QN AUTO: 28.9 PG (ref 26–34)
MCHC RBC AUTO-ENTMCNC: 33.4 G/DL (ref 31–36)
MCV RBC AUTO: 86.7 FL (ref 80–100)
MONOCYTES ABSOLUTE: 0.3 K/UL (ref 0–1.3)
MONOCYTES RELATIVE PERCENT: 8.2 %
NEUTROPHILS ABSOLUTE: 1.7 K/UL (ref 1.7–7.7)
NEUTROPHILS RELATIVE PERCENT: 53.4 %
PDW BLD-RTO: 14 % (ref 12.4–15.4)
PLATELET # BLD: 215 K/UL (ref 135–450)
PMV BLD AUTO: 9.9 FL (ref 5–10.5)
POTASSIUM SERPL-SCNC: 5.3 MMOL/L (ref 3.5–5.1)
RBC # BLD: 4.62 M/UL (ref 4–5.2)
SODIUM BLD-SCNC: 141 MMOL/L (ref 136–145)
TOTAL PROTEIN: 7.2 G/DL (ref 6.4–8.2)
TRIGL SERPL-MCNC: 84 MG/DL (ref 0–150)
TSH REFLEX: 2.19 UIU/ML (ref 0.27–4.2)
VITAMIN D 25-HYDROXY: 56.4 NG/ML
VLDLC SERPL CALC-MCNC: 17 MG/DL
WBC # BLD: 3.1 K/UL (ref 4–11)

## 2023-02-22 NOTE — PROGRESS NOTES
History and Physical      Vin Faribanks  YOB: 1955    Date of Service:  2/22/2023    Chief Complaint:   Vin Fairbanks is a 79 y.o. female who presents for complete physical examination. HPI:          Internal Administration   First Dose COVID-19, PIEDAD ordaz, Primary or Immunocompromised, (age 12y+), IM, 100 mcg/0.5mL  01/13/2021   Second Dose COVID-19, PIEDAD ordaz, Primary or Immunocompromised, (age 12y+), IM, 100 mcg/0.5mL   02/09/2021       Last COVID Lab No results found for: SARS-COV-2, SARS-COV-2 RNA, SARS-COV-2, SARS-COV-2, SARS-COV-2 BY PCR, SARS-COV-2, SARS-COV-2, SARS-COV-2         [x] Patient has completed an advance directive  [] Patient has NOT completed an advanced directive  [x] Patient has a documented healthcare surrogate  [] Patient does NOT have a documented healthcare surrogate  [] Discussed the importance of establishing and updating an advanced directive. Patient has questions at this time and those were answered. [x] Discussed the importance of establishing and updating an advanced directive. Patient does NOT have questions at this time.     Discussed with: [x] Patient            [] Family             [] Other caregiver      Wt Readings from Last 3 Encounters:   02/22/23 139 lb (63 kg)   02/16/22 138 lb (62.6 kg)   03/16/21 139 lb 9.6 oz (63.3 kg)     BP Readings from Last 3 Encounters:   02/22/23 124/74   02/16/22 (!) 161/83   03/16/21 128/76     Lab Results   Component Value Date    LABA1C 5.2 07/02/2018       Patient Active Problem List   Diagnosis    Osteopenia    History of total vaginal hysterectomy (TVH)    Menopause    Pap smear of cervix reminder not needed forever    Chest pain    SOB (shortness of breath) on exertion    Deep venous thrombosis (HCC)    Ventricular enlargement, right    History of pulmonary embolism    Vaginal atrophy       Preventive Care:  Health Maintenance   Topic Date Due    Lipids  04/07/2023    Breast cancer screen 06/30/2023    Depression Screen  02/21/2024    Annual Wellness Visit (AWV)  02/22/2024    Colorectal Cancer Screen  02/25/2027    DTaP/Tdap/Td vaccine (3 - Td or Tdap) 02/26/2029    DEXA (modify frequency per FRAX score)  Completed    Flu vaccine  Completed    Shingles vaccine  Completed    Pneumococcal 65+ years Vaccine  Completed    COVID-19 Vaccine  Completed    Hepatitis C screen  Completed    Hepatitis A vaccine  Aged Out    Hib vaccine  Aged Out    Meningococcal (ACWY) vaccine  Aged Out          Advance Directive: N, <no information>    Immunization History   Administered Date(s) Administered    COVID-19, MODERNA BLUE border, Primary or Immunocompromised, (age 12y+), IM, 100 mcg/0.5mL 01/13/2021, 02/09/2021, 10/29/2021, 06/01/2022    COVID-19, MODERNA Bivalent BOOSTER, (age 12y+), IM, 50 mcg/0.5 mL 10/25/2022    Influenza 10/01/2015    Influenza Vaccine, unspecified formulation 10/15/2016    Influenza Virus Vaccine 10/11/2017, 10/15/2018, 10/11/2019, 10/26/2021    Influenza, FLUARIX, FLULAVAL, FLUZONE (age 6 mo+) AND AFLURIA, (age 3 y+), PF, 0.5mL 10/19/2020    Pneumococcal Polysaccharide ([id number]) 09/14/2020    Td (Adult), 2 Lf Tetanus Toxoid, Pf (Td, Absorbed) 02/26/2019    Tdap (Boostrix, Adacel) 12/01/2008    Zoster Live (Zostavax) 01/12/2016    Zoster Recombinant (Shingrix) 08/14/2018, 11/30/2018       Allergies   Allergen Reactions    Erythromycin Nausea Only     Outpatient Medications Marked as Taking for the 2/22/23 encounter (Office Visit) with Matty Pedro MD   Medication Sig Dispense Refill    celecoxib (CELEBREX) 200 MG capsule TAKE ONE CAPSULE BY MOUTH TWO TIMES DAILY AS NEEDED 180 capsule 3    ELIQUIS 5 MG TABS tablet TAKE 1 TABLET TWICE A  tablet 3    ibandronate (BONIVA) 150 MG tablet Take 1 tablet by mouth every 30 days Take one (1) tablet once per month in the morning with a full glass of water, on an empty stomach, and do not take anything else by mouth or lie down  for the 3 tablet 5    rosuvastatin (CRESTOR) 5 MG tablet Take 1 tablet by mouth daily 90 tablet 3    pseudoephedrine (SUDAFED) 60 MG tablet Take 1 tablet by mouth 2 times daily 180 tablet 3    famciclovir (FAMVIR) 500 MG tablet Take 2 tablets twice a day per fever blister outbreak 20 tablet 0    DiphenhydrAMINE HCl (BENADRYL ALLERGY PO) Take 25 mg by mouth nightly      Cholecalciferol (VITAMIN D) 2000 UNITS CAPS capsule Take 2,000 Units by mouth      Calcium Carbonate-Vitamin D (CALCIUM-VITAMIN D) 500-200 MG-UNIT per tablet Take 1 tablet by mouth nightly      therapeutic multivitamin-minerals (THERAGRAN-M) tablet Take 1 tablet by mouth daily. Past Medical History:   Diagnosis Date    Allergic rhinitis     BRCA1 negative     BRCA2 negative     Osteoarthritis     Osteoporosis     Pulmonary emboli ([facility] [address] 07/2018     Past Surgical History:   Procedure Laterality Date    BUNIONECTOMY      COLONOSCOPY  01/2017    normal    HYSTERECTOMY (CERVIX STATUS UNKNOWN)      TUBAL LIGATION       Family History   Problem Relation Age of Onset    Cancer Mother 79        breast, NMSC    High Blood Pressure Mother     Breast Cancer Mother         in her 66's    Cancer Father 61        colon ca, prostate    High Blood Pressure Father     Cancer Sister 36        breast    Hypertension Brother     Prostate Cancer Brother     Atrial Fibrillation Brother     Hypertension Brother     Breast Cancer Maternal Aunt         in her 42's     Social History     Socioeconomic History    Marital status:       Spouse name: Not on file    Number of children: 4    Years of education: Not on file    Highest education level: Not on file   Occupational History    Occupation: nurse   Tobacco Use    Smoking status: Never    Smokeless tobacco: Never   Vaping Use    Vaping Use: Never used   Substance and Sexual Activity    Alcohol use: Yes     Comment: rarely    Drug use: No    Sexual activity: Not Currently     Partners: Male   Other Topics Concern    Not on file   Social History Narrative    Not on file     Social Determinants of Health     Financial Resource Strain: Low Risk     Difficulty of Paying Living Expenses: Not hard at all   Food Insecurity: No Food Insecurity    Worried About [address] in the Last Year: Never true    [facility] in the Last Year: Never true   Transportation Needs: Unknown    Lack of Transportation (Medical): Not on file    Lack of Transportation (Non-Medical): No   Physical Activity: Sufficiently Active    Days of Exercise per Week: 6 days    Minutes of Exercise per Session: 40 min   Stress: Not on file   Social Connections: Not on file   Intimate Partner Violence: Not on file   Housing Stability: Unknown    Unable to Pay for Housing in the Last Year: Not on file    Number of Places Lived in the Last Year: Not on file    Unstable Housing in the Last Year: No       Review of Systems:  A comprehensive review of systems was negative except for what was noted in the HPI. Constitutional: No fatigue or weight loss. Respiratory: No cough or dyspnea. Cardiovascular: No chest pain or edema. Gastrointestinal: No constipation or diarrhea. Additional review of systems may be scanned into the media section of this medical record. Any responses requiring further intervention were pursued. Physical Exam:   Vitals:    02/22/23 0720 02/22/23 0722   BP: (!) 145/81 124/74   Pulse: 60    SpO2: 93%    Weight: 139 lb (63 kg)      Body mass index is 22.1 kg/m². Constitutional: She is oriented to person, place, and time. She appears well-developed and well-nourished. No distress. HEENT:   Head: Normocephalic and atraumatic. Right Ear: Tympanic membrane, external ear and ear canal normal.   Left Ear: Tympanic membrane, external ear and ear canal normal.   Nose: Nose normal.   Mouth/Throat: Oropharynx is clear and moist, and mucous membranes are normal.  There is no cervical adenopathy.   Eyes: Conjunctivae and extraocular motions are normal. Pupils are equal, round, and reactive to light. Neck: Neck supple. No JVD present. Carotid bruit is not present. No mass and no thyromegaly present. Cardiovascular: Normal rate, regular rhythm, normal heart sounds and intact distal pulses. Exam reveals no gallop and no friction rub. No murmur heard. Pulmonary/Chest: Effort normal and breath sounds normal. No respiratory distress. She has no wheezes, rhonchi or rales. Abdominal: Soft, non-tender. Bowel sounds and aorta are normal. She exhibits no organomegaly, mass or bruit. Musculoskeletal: Normal range of motion, no synovitis. She exhibits no edema. Neurological: She is alert and oriented to person, place, and time. She has normal reflexes. No cranial nerve deficit. Coordination normal.   Skin: Skin is warm and dry. There is no rash or erythema. No suspicious lesions noted. Psychiatric: She has a normal mood and affect. Her speech is normal and behavior is normal. Judgment, cognition and memory are normal.     Assessment/Plan:     Diagnosis Orders   1. Annual physical exam  CBC with Auto Differential    Comprehensive Metabolic Panel      2. Screening for lipid disorders  LIPID PANEL      3. Hyperglycemia  Hemoglobin A1C      4. Osteopenia, unspecified location  Vitamin D 25 Hydroxy      5. Screening for thyroid disorder  TSH with Reflex      Continues to do very well. Taking Celebrex twice daily. She did mention an issue with her right hand getting cold when she is holding the steering wheel or if she is pumping her arms while exercising. This has been going on for years. The right radial pulse may be slightly less intense than the left but it is minimal.  Cap refill the same bilateral.  Hand temperature the same bilateral.  Tinel's sign negative.   Suspect some degree of arterial insufficiency whether that is thoracic outlet or more distal but at this point she is not interested in further work-up, the pattern has not changed over years she will let me know if work-up is needed if so we would start with an arterial Doppler. She keeps an eye on her pulses at home and also has checked her blood pressure in both arms without significant difference. AWV was updated yesterday. No symptoms of elevated sugar. She remains on Boniva DEXA scan is about a year and a half since last done. GYN follow-up upcoming. Blanca Ga later this year. Colonoscopy due again in 2027. Follow-up 1 year    Patient should call the office immediately with new or ongoing signs or symptoms or worsening, or proceed to the emergency room. No changes in past medical history, past surgical history, social history, or family history were noted during the patient encounter unless specifically listed above. All updates of past medical history, past surgical history, social history, or family history were reviewed personally by me during the office visit. All problems listed in the assessment are stable unless noted otherwise. Medication profile reviewed personally by me during the visit. Medication side effects and possible impairments from medications were discussed as applicable. Unless stated otherwise, we will continue current medications as listed in the medication review report contained in the patient's medical record. This document was prepared by a combination of typing and transcription through a voice recognition software. Scribe attestation: Petrona Menjivar RN, am scribing for and in the presence of Palak Kingsley MD. Electronically signed by Jun Bhardwaj RN on 2/22/2023 at 7:38 AM      Provider attestation:     I, Dr. Malinda Rome, personally performed the services described in this documentation, as scribed by the above signed scribe in my presence, and it is both accurate and complete.  I agree with the ROS and Past Histories independently gathered by the clinical support staff and the remaining scribed note accurately describes my personal service to the patient.       2/22/2023    7:54 AM

## 2023-02-22 NOTE — PATIENT INSTRUCTIONS

## 2023-02-23 LAB
ESTIMATED AVERAGE GLUCOSE: 105.4 MG/DL
HBA1C MFR BLD: 5.3 %

## 2023-03-14 ENCOUNTER — OFFICE VISIT (OUTPATIENT)
Dept: OBGYN CLINIC | Age: 68
End: 2023-03-14

## 2023-03-14 VITALS
BODY MASS INDEX: 21.94 KG/M2 | SYSTOLIC BLOOD PRESSURE: 118 MMHG | TEMPERATURE: 98.3 F | DIASTOLIC BLOOD PRESSURE: 72 MMHG | HEIGHT: 67 IN | HEART RATE: 63 BPM | WEIGHT: 139.8 LBS

## 2023-03-14 DIAGNOSIS — Z01.419 WOMEN'S ANNUAL ROUTINE GYNECOLOGICAL EXAMINATION: Primary | ICD-10-CM

## 2023-03-14 DIAGNOSIS — M85.80 OSTEOPENIA, UNSPECIFIED LOCATION: ICD-10-CM

## 2023-03-14 DIAGNOSIS — Z53.8 PAP SMEAR OF CERVIX NOT NEEDED: ICD-10-CM

## 2023-03-14 DIAGNOSIS — Z90.710 HISTORY OF TOTAL VAGINAL HYSTERECTOMY (TVH): ICD-10-CM

## 2023-03-14 DIAGNOSIS — Z86.711 HISTORY OF PULMONARY EMBOLISM: ICD-10-CM

## 2023-03-14 DIAGNOSIS — N95.2 VAGINAL ATROPHY: ICD-10-CM

## 2023-03-22 ENCOUNTER — PATIENT MESSAGE (OUTPATIENT)
Dept: FAMILY MEDICINE CLINIC | Age: 68
End: 2023-03-22

## 2023-03-22 RX ORDER — ROSUVASTATIN CALCIUM 5 MG/1
5 TABLET, COATED ORAL DAILY
Qty: 90 TABLET | Refills: 3 | Status: SHIPPED | OUTPATIENT
Start: 2023-03-22

## 2023-03-22 RX ORDER — ROSUVASTATIN CALCIUM 5 MG/1
5 TABLET, COATED ORAL DAILY
Qty: 90 TABLET | Refills: 3 | OUTPATIENT
Start: 2023-03-22

## 2023-03-22 NOTE — TELEPHONE ENCOUNTER
From: Dante Estrada  To: Dr. Joanne Lorenz: 3/22/2023 2:22 PM EDT  Subject: Colonoscopy January 2022    Could you please request copy of my colonoscopy from [facility] and scan it into my chart so I can print off the report for my records. It looks like just the cover sheet was scanned in to remove from my health maintenance but no results visible.  Thanks

## 2023-03-22 NOTE — TELEPHONE ENCOUNTER
Erika Moreau is requesting refill(s)   Last OV 2/22/23 (pertaining to medication)  LR 4/11/22 (per medication requested)  Next office visit scheduled or attempted Yes   If no, reason:      Pt requesting 1 year supply

## 2023-03-26 ASSESSMENT — ENCOUNTER SYMPTOMS
VOMITING: 0
SHORTNESS OF BREATH: 0
ABDOMINAL DISTENTION: 0
ABDOMINAL PAIN: 0
CONSTIPATION: 0
DIARRHEA: 0
NAUSEA: 0

## 2023-03-26 NOTE — PROGRESS NOTES
Subjective:      Patient ID: Nash Vu is a 79 y.o. female. HPI  80 y/o  female presents for well woman examination. Last pap smear was 2014-normal.  No history of abnormal pap smear. No vaginal bleeding, no vaginal discharge. History significant for total vaginal hysterectomy. Surgery was performed > 15 years ago in conjunction with bladder suspension. States suspension worked well. Currently leaks if unable to get to restroom in time or drinks before exercise/activity--declines intervention. No recent sexual activity. Family history positive for father with colon cancer and mother with breast cancer. Patient is BRCA negative. Colonoscopy normal in . Follow up due in . Mammogram is planned for July. Dexa scan on 19 indicated osteopenia. Has been on Boniva or Fosamax x 5 years in the past.  No pharmacologic therapy. Takes Vitamin D and calcium and participates in weight bearing exercise. Has upcoming DEXA scan--23. Medical history significant for multiple pulmonary emboli--on lifelong Eliquis. Has had no further emboli since last visit. Review of Systems   Constitutional:  Negative for activity change, appetite change, chills, fatigue, fever and unexpected weight change. Respiratory:  Negative for shortness of breath. Cardiovascular:  Negative for chest pain. Gastrointestinal:  Negative for abdominal distention, abdominal pain, constipation, diarrhea, nausea and vomiting. Endocrine: Negative for cold intolerance and heat intolerance. Genitourinary:  Positive for difficulty urinating. Negative for dyspareunia, dysuria, frequency, genital sores, hematuria, menstrual problem, pelvic pain, vaginal bleeding, vaginal discharge and vaginal pain. Skin:  Negative for rash. Neurological:  Negative for headaches. Hematological:  Does not bruise/bleed easily. Objective:   Physical Exam  Vitals and nursing note reviewed.  Exam

## 2023-05-03 ENCOUNTER — PATIENT MESSAGE (OUTPATIENT)
Dept: FAMILY MEDICINE CLINIC | Age: 68
End: 2023-05-03

## 2023-05-03 DIAGNOSIS — J01.90 ACUTE BACTERIAL SINUSITIS: Primary | ICD-10-CM

## 2023-05-03 DIAGNOSIS — B96.89 ACUTE BACTERIAL SINUSITIS: Primary | ICD-10-CM

## 2023-05-03 RX ORDER — AMOXICILLIN 875 MG/1
875 TABLET, COATED ORAL 2 TIMES DAILY
Qty: 20 TABLET | Refills: 0 | Status: SHIPPED | OUTPATIENT
Start: 2023-05-03 | End: 2023-05-13

## 2023-05-03 RX ORDER — AMOXICILLIN AND CLAVULANATE POTASSIUM 875; 125 MG/1; MG/1
1 TABLET, FILM COATED ORAL 2 TIMES DAILY
Qty: 20 TABLET | Refills: 0 | Status: SHIPPED | OUTPATIENT
Start: 2023-05-03 | End: 2023-05-03

## 2023-06-01 DIAGNOSIS — M62.838 MUSCLE SPASM: Primary | ICD-10-CM

## 2023-06-01 RX ORDER — CYCLOBENZAPRINE HCL 5 MG
TABLET ORAL
Qty: 60 TABLET | Refills: 1 | Status: SHIPPED | OUTPATIENT
Start: 2023-06-01

## 2023-06-28 DIAGNOSIS — I82.493 ACUTE DEEP VEIN THROMBOSIS (DVT) OF OTHER SPECIFIED VEIN OF BOTH LOWER EXTREMITIES (HCC): ICD-10-CM

## 2023-06-28 RX ORDER — IBANDRONATE SODIUM 150 MG/1
TABLET, FILM COATED ORAL
Qty: 3 TABLET | Refills: 3 | Status: SHIPPED | OUTPATIENT
Start: 2023-06-28

## 2023-06-28 NOTE — TELEPHONE ENCOUNTER
Pamela Nelson is requesting refill(s)   Last OV 2/22/23 (pertaining to medication)  LR 6/20/22 (per medication requested)  Next office visit scheduled or attempted Yes   If no, reason:  2/26/24

## 2023-06-30 ENCOUNTER — HOSPITAL ENCOUNTER (OUTPATIENT)
Dept: MAMMOGRAPHY | Age: 68
Discharge: HOME | End: 2023-06-30
Payer: MEDICARE

## 2023-06-30 ENCOUNTER — HOSPITAL ENCOUNTER (OUTPATIENT)
Dept: GENERAL RADIOLOGY | Age: 68
Discharge: HOME | End: 2023-06-30
Payer: MEDICARE

## 2023-06-30 DIAGNOSIS — Z79.83 ENCOUNTER FOR MONITORING IBANDRONATE THERAPY: ICD-10-CM

## 2023-06-30 DIAGNOSIS — Z51.81 ENCOUNTER FOR MONITORING IBANDRONATE THERAPY: ICD-10-CM

## 2023-06-30 DIAGNOSIS — Z78.0 MENOPAUSE: ICD-10-CM

## 2023-06-30 DIAGNOSIS — Z12.31 ENCOUNTER FOR SCREENING MAMMOGRAM FOR MALIGNANT NEOPLASM OF BREAST: ICD-10-CM

## 2023-06-30 DIAGNOSIS — Z87.81 HISTORY OF WRIST FRACTURE: ICD-10-CM

## 2023-06-30 DIAGNOSIS — M85.80 OSTEOPENIA, UNSPECIFIED LOCATION: ICD-10-CM

## 2023-06-30 PROCEDURE — 77080 DXA BONE DENSITY AXIAL: CPT

## 2023-06-30 PROCEDURE — 77063 BREAST TOMOSYNTHESIS BI: CPT

## 2023-08-29 DIAGNOSIS — M19.90 ARTHRITIS: ICD-10-CM

## 2023-08-29 RX ORDER — CELECOXIB 200 MG/1
CAPSULE ORAL
Qty: 180 CAPSULE | Refills: 3 | Status: SHIPPED | OUTPATIENT
Start: 2023-08-29

## 2023-08-29 NOTE — TELEPHONE ENCOUNTER
Refill Request     CONFIRM preferrred pharmacy with the patient. If Mail Order Rx - Pend for 90 day refill. Last Seen: Last Seen Department: 2/22/2023  Last Seen by PCP: 2/22/2023    Last Written: 12/07/2022    If no future appointment scheduled, route STAFF MESSAGE with patient name to the [facility] for scheduling. Next Appointment:   Future Appointments   Date Time Provider [address] [place] [address]   2/26/2024  9:00 AM Jose Rojo MD [address]       Message sent to  to schedule appt with patient?   N/A      Requested Prescriptions     Pending Prescriptions Disp Refills    celecoxib (CELEBREX) 200 MG capsule [Pharmacy Med Name: CELECOXIB 200MG CAPSULE] 180 capsule 3     Sig: TAKE ONE CAPSULE BY MOUTH TWO TIMES DAILY AS NEEDED

## 2023-09-05 RX ORDER — APIXABAN 5 MG/1
TABLET, FILM COATED ORAL
Qty: 180 TABLET | Refills: 3 | Status: SHIPPED | OUTPATIENT
Start: 2023-09-05

## 2023-09-14 RX ORDER — FAMCICLOVIR 500 MG/1
TABLET ORAL
Qty: 20 TABLET | Refills: 0 | Status: SHIPPED | OUTPATIENT
Start: 2023-09-14

## 2024-02-26 ENCOUNTER — OFFICE VISIT (OUTPATIENT)
Dept: FAMILY MEDICINE CLINIC | Age: 69
End: 2024-02-26
Payer: MEDICARE

## 2024-02-26 VITALS
HEART RATE: 63 BPM | WEIGHT: 142 LBS | DIASTOLIC BLOOD PRESSURE: 82 MMHG | BODY MASS INDEX: 22.58 KG/M2 | OXYGEN SATURATION: 98 % | SYSTOLIC BLOOD PRESSURE: 124 MMHG

## 2024-02-26 DIAGNOSIS — Z13.29 SCREENING FOR THYROID DISORDER: ICD-10-CM

## 2024-02-26 DIAGNOSIS — Z13.220 SCREENING FOR LIPID DISORDERS: ICD-10-CM

## 2024-02-26 DIAGNOSIS — L21.9 SEBORRHEIC DERMATITIS: ICD-10-CM

## 2024-02-26 DIAGNOSIS — Z13.21 ENCOUNTER FOR VITAMIN DEFICIENCY SCREENING: ICD-10-CM

## 2024-02-26 DIAGNOSIS — H93.13 TINNITUS OF BOTH EARS: ICD-10-CM

## 2024-02-26 DIAGNOSIS — M19.90 OSTEOARTHRITIS, UNSPECIFIED OSTEOARTHRITIS TYPE, UNSPECIFIED SITE: Primary | ICD-10-CM

## 2024-02-26 DIAGNOSIS — R73.9 HYPERGLYCEMIA: ICD-10-CM

## 2024-02-26 DIAGNOSIS — R03.0 ELEVATED BLOOD PRESSURE READING IN OFFICE WITHOUT DIAGNOSIS OF HYPERTENSION: ICD-10-CM

## 2024-02-26 LAB
25(OH)D3 SERPL-MCNC: 57.2 NG/ML
ALBUMIN SERPL-MCNC: 4.5 G/DL (ref 3.4–5)
ALBUMIN/GLOB SERPL: 1.7 {RATIO} (ref 1.1–2.2)
ALP SERPL-CCNC: 62 U/L (ref 40–129)
ALT SERPL-CCNC: 12 U/L (ref 10–40)
ANION GAP SERPL CALCULATED.3IONS-SCNC: 13 MMOL/L (ref 3–16)
AST SERPL-CCNC: 19 U/L (ref 15–37)
BASOPHILS # BLD: 0 K/UL (ref 0–0.2)
BASOPHILS NFR BLD: 0.6 %
BILIRUB SERPL-MCNC: 0.4 MG/DL (ref 0–1)
BUN SERPL-MCNC: 16 MG/DL (ref 7–20)
CALCIUM SERPL-MCNC: 9.8 MG/DL (ref 8.3–10.6)
CHLORIDE SERPL-SCNC: 101 MMOL/L (ref 99–110)
CHOLEST SERPL-MCNC: 188 MG/DL (ref 0–199)
CO2 SERPL-SCNC: 26 MMOL/L (ref 21–32)
CREAT SERPL-MCNC: 0.6 MG/DL (ref 0.6–1.2)
DEPRECATED RDW RBC AUTO: 13 % (ref 12.4–15.4)
EOSINOPHIL # BLD: 0.1 K/UL (ref 0–0.6)
EOSINOPHIL NFR BLD: 1.3 %
GFR SERPLBLD CREATININE-BSD FMLA CKD-EPI: >60 ML/MIN/{1.73_M2}
GLUCOSE SERPL-MCNC: 86 MG/DL (ref 70–99)
HCT VFR BLD AUTO: 39.7 % (ref 36–48)
HDLC SERPL-MCNC: 82 MG/DL (ref 40–60)
HGB BLD-MCNC: 13.3 G/DL (ref 12–16)
LDLC SERPL CALC-MCNC: 95 MG/DL
LYMPHOCYTES # BLD: 1.2 K/UL (ref 1–5.1)
LYMPHOCYTES NFR BLD: 30.5 %
MCH RBC QN AUTO: 28.9 PG (ref 26–34)
MCHC RBC AUTO-ENTMCNC: 33.5 G/DL (ref 31–36)
MCV RBC AUTO: 86.2 FL (ref 80–100)
MONOCYTES # BLD: 0.3 K/UL (ref 0–1.3)
MONOCYTES NFR BLD: 6.9 %
NEUTROPHILS # BLD: 2.4 K/UL (ref 1.7–7.7)
NEUTROPHILS NFR BLD: 60.7 %
PLATELET # BLD AUTO: 207 K/UL (ref 135–450)
PMV BLD AUTO: 9.5 FL (ref 5–10.5)
POTASSIUM SERPL-SCNC: 4.8 MMOL/L (ref 3.5–5.1)
PROT SERPL-MCNC: 7.1 G/DL (ref 6.4–8.2)
RBC # BLD AUTO: 4.6 M/UL (ref 4–5.2)
SODIUM SERPL-SCNC: 140 MMOL/L (ref 136–145)
TRIGL SERPL-MCNC: 56 MG/DL (ref 0–150)
TSH SERPL DL<=0.005 MIU/L-ACNC: 0.98 UIU/ML (ref 0.27–4.2)
VLDLC SERPL CALC-MCNC: 11 MG/DL
WBC # BLD AUTO: 3.9 K/UL (ref 4–11)

## 2024-02-26 PROCEDURE — G8399 PT W/DXA RESULTS DOCUMENT: HCPCS | Performed by: FAMILY MEDICINE

## 2024-02-26 PROCEDURE — 1123F ACP DISCUSS/DSCN MKR DOCD: CPT | Performed by: FAMILY MEDICINE

## 2024-02-26 PROCEDURE — 99214 OFFICE O/P EST MOD 30 MIN: CPT | Performed by: FAMILY MEDICINE

## 2024-02-26 PROCEDURE — G0009 ADMIN PNEUMOCOCCAL VACCINE: HCPCS | Performed by: FAMILY MEDICINE

## 2024-02-26 PROCEDURE — G8484 FLU IMMUNIZE NO ADMIN: HCPCS | Performed by: FAMILY MEDICINE

## 2024-02-26 PROCEDURE — 3017F COLORECTAL CA SCREEN DOC REV: CPT | Performed by: FAMILY MEDICINE

## 2024-02-26 PROCEDURE — 1036F TOBACCO NON-USER: CPT | Performed by: FAMILY MEDICINE

## 2024-02-26 PROCEDURE — 90677 PCV20 VACCINE IM: CPT | Performed by: FAMILY MEDICINE

## 2024-02-26 PROCEDURE — 36415 COLL VENOUS BLD VENIPUNCTURE: CPT | Performed by: FAMILY MEDICINE

## 2024-02-26 PROCEDURE — 1090F PRES/ABSN URINE INCON ASSESS: CPT | Performed by: FAMILY MEDICINE

## 2024-02-26 PROCEDURE — G8427 DOCREV CUR MEDS BY ELIG CLIN: HCPCS | Performed by: FAMILY MEDICINE

## 2024-02-26 PROCEDURE — G8420 CALC BMI NORM PARAMETERS: HCPCS | Performed by: FAMILY MEDICINE

## 2024-02-26 SDOH — ECONOMIC STABILITY: FOOD INSECURITY: WITHIN THE PAST 12 MONTHS, YOU WORRIED THAT YOUR FOOD WOULD RUN OUT BEFORE YOU GOT MONEY TO BUY MORE.: NEVER TRUE

## 2024-02-26 SDOH — ECONOMIC STABILITY: INCOME INSECURITY: HOW HARD IS IT FOR YOU TO PAY FOR THE VERY BASICS LIKE FOOD, HOUSING, MEDICAL CARE, AND HEATING?: NOT HARD AT ALL

## 2024-02-26 SDOH — ECONOMIC STABILITY: FOOD INSECURITY: WITHIN THE PAST 12 MONTHS, THE FOOD YOU BOUGHT JUST DIDN'T LAST AND YOU DIDN'T HAVE MONEY TO GET MORE.: NEVER TRUE

## 2024-02-26 ASSESSMENT — PATIENT HEALTH QUESTIONNAIRE - PHQ9
2. FEELING DOWN, DEPRESSED OR HOPELESS: 0
SUM OF ALL RESPONSES TO PHQ QUESTIONS 1-9: 0
SUM OF ALL RESPONSES TO PHQ9 QUESTIONS 1 & 2: 0
SUM OF ALL RESPONSES TO PHQ QUESTIONS 1-9: 0

## 2024-02-26 NOTE — PROGRESS NOTES
Historical, MD     Allergies   Allergen Reactions    Erythromycin Nausea Only       OBJECTIVE:  Estimated body mass index is 22.23 kg/m² as calculated from the following:    Height as of 3/14/23: 1.689 m (5' 6.5\").    Weight as of 3/14/23: 63.4 kg (139 lb 12.8 oz).  There were no vitals filed for this visit.    Physical Exam  Vitals and nursing note reviewed.   Constitutional:       General: She is not in acute distress.     Appearance: She is well-developed. She is not diaphoretic.   HENT:      Head: Normocephalic and atraumatic.      Right Ear: External ear normal.      Left Ear: External ear normal.      Nose: Nose normal.   Eyes:      General: Lids are normal. No scleral icterus.        Right eye: No discharge.         Left eye: No discharge.      Conjunctiva/sclera: Conjunctivae normal.      Pupils: Pupils are equal, round, and reactive to light.   Neck:      Thyroid: No thyromegaly.      Vascular: No JVD.      Trachea: No tracheal deviation.   Cardiovascular:      Rate and Rhythm: Normal rate and regular rhythm.      Heart sounds: Normal heart sounds. No murmur heard.     No friction rub. No gallop.   Pulmonary:      Effort: Pulmonary effort is normal. No respiratory distress.      Breath sounds: Normal breath sounds. No stridor. No wheezing or rales.   Chest:      Chest wall: No tenderness.   Abdominal:      General: Bowel sounds are normal. There is no distension.      Palpations: Abdomen is soft. There is no mass.      Tenderness: There is no abdominal tenderness. There is no guarding.   Musculoskeletal:         General: No tenderness. Normal range of motion.      Cervical back: Normal range of motion and neck supple.   Lymphadenopathy:      Head:      Right side of head: No submental, submandibular, tonsillar, preauricular, posterior auricular or occipital adenopathy.      Left side of head: No submental, submandibular, tonsillar, preauricular, posterior auricular or occipital adenopathy.      Cervical: No

## 2024-02-26 NOTE — PATIENT INSTRUCTIONS
Not feeling your best?  Where to go for the right care at the right time.    Dear Shayla Caal   I wanted to provide you with some information that might help you seek care for your condition when your primary care provider or specialist is unavailable. If you have a need outside of normal business hours, you should first contact your primary care office or specialist caring for your condition. They may have on-call providers that could assist with your care. During office hours, you may request a virtual or same day appointment.   But what if your primary care provider is not in the office that day and you can't wait until the  next day for care? In that situation, your next option is to visit an urgent care facility.          [facility] now has urgent care sites open to support our community.   [facility] is a great alternative when you need immediate medical care that is not a serious threat to your health or your doctor's office is closed or unable to get you in for an appointment. The urgent care centers offer fast access to [facility] doctors for minor illnesses and injuries for patients of all ages. There are other medical services available including lab testing, X-rays, EKGs, and IV fluids.  Locations are open daily from 8 a.m. - 8 p.m.     [facility]  [address], [place], [place] [address]  [phone]    [address], [place], [place] [address]  [phone]    Local Urgent Care     [facility] 8:30 am - 7:70 pm   [address] [place] [address]  [phone]    [facility]     8 am - 8 pm   [address] [place] [address]  [phone]    [facility] [place] 7 am - 7:30 pm  [address] [place] [address]  [phone]     [facility] [place] 7 am - 7:30 pm  [address] [place] [address]  [phone]    Kevin

## 2024-02-27 LAB
EST. AVERAGE GLUCOSE BLD GHB EST-MCNC: 105.4 MG/DL
HBA1C MFR BLD: 5.3 %

## 2024-02-28 RX ORDER — ROSUVASTATIN CALCIUM 5 MG/1
5 TABLET, COATED ORAL DAILY
Qty: 90 TABLET | Refills: 3 | Status: SHIPPED | OUTPATIENT
Start: 2024-02-28

## 2024-03-27 DIAGNOSIS — I82.493 ACUTE DEEP VEIN THROMBOSIS (DVT) OF OTHER SPECIFIED VEIN OF BOTH LOWER EXTREMITIES (HCC): ICD-10-CM

## 2024-03-27 RX ORDER — ROSUVASTATIN CALCIUM 5 MG/1
5 TABLET, COATED ORAL DAILY
Qty: 90 TABLET | Refills: 3 | Status: SHIPPED | OUTPATIENT
Start: 2024-03-27

## 2024-03-27 RX ORDER — IBANDRONATE SODIUM 150 MG/1
TABLET, FILM COATED ORAL
Qty: 3 TABLET | Refills: 3 | Status: SHIPPED | OUTPATIENT
Start: 2024-03-27

## 2024-04-01 ENCOUNTER — TELEMEDICINE (OUTPATIENT)
Dept: FAMILY MEDICINE CLINIC | Age: 69
End: 2024-04-01
Payer: MEDICARE

## 2024-04-01 DIAGNOSIS — Z00.00 MEDICARE ANNUAL WELLNESS VISIT, SUBSEQUENT: Primary | ICD-10-CM

## 2024-04-01 PROCEDURE — 3017F COLORECTAL CA SCREEN DOC REV: CPT | Performed by: FAMILY MEDICINE

## 2024-04-01 PROCEDURE — 1123F ACP DISCUSS/DSCN MKR DOCD: CPT | Performed by: FAMILY MEDICINE

## 2024-04-01 PROCEDURE — G0439 PPPS, SUBSEQ VISIT: HCPCS | Performed by: FAMILY MEDICINE

## 2024-04-01 SDOH — HEALTH STABILITY: PHYSICAL HEALTH: ON AVERAGE, HOW MANY DAYS PER WEEK DO YOU ENGAGE IN MODERATE TO STRENUOUS EXERCISE (LIKE A BRISK WALK)?: 5 DAYS

## 2024-04-01 SDOH — HEALTH STABILITY: PHYSICAL HEALTH: ON AVERAGE, HOW MANY MINUTES DO YOU ENGAGE IN EXERCISE AT THIS LEVEL?: 40 MIN

## 2024-04-01 ASSESSMENT — LIFESTYLE VARIABLES
HOW MANY STANDARD DRINKS CONTAINING ALCOHOL DO YOU HAVE ON A TYPICAL DAY: 1 OR 2
HOW OFTEN DO YOU HAVE A DRINK CONTAINING ALCOHOL: 2
HOW OFTEN DO YOU HAVE A DRINK CONTAINING ALCOHOL: MONTHLY OR LESS
HOW OFTEN DO YOU HAVE SIX OR MORE DRINKS ON ONE OCCASION: 1
HOW MANY STANDARD DRINKS CONTAINING ALCOHOL DO YOU HAVE ON A TYPICAL DAY: 1

## 2024-04-01 ASSESSMENT — PATIENT HEALTH QUESTIONNAIRE - PHQ9
SUM OF ALL RESPONSES TO PHQ QUESTIONS 1-9: 0
SUM OF ALL RESPONSES TO PHQ9 QUESTIONS 1 & 2: 0
SUM OF ALL RESPONSES TO PHQ QUESTIONS 1-9: 0
2. FEELING DOWN, DEPRESSED OR HOPELESS: NOT AT ALL
1. LITTLE INTEREST OR PLEASURE IN DOING THINGS: NOT AT ALL
SUM OF ALL RESPONSES TO PHQ QUESTIONS 1-9: 0
SUM OF ALL RESPONSES TO PHQ QUESTIONS 1-9: 0

## 2024-04-01 NOTE — PROGRESS NOTES
Tammy Hernandez, APRN - CNP   pseudoephedrine (SUDAFED) 60 MG tablet Take 1 tablet by mouth 2 times daily  Matty Pedro MD   DiphenhydrAMINE HCl (BENADRYL ALLERGY PO) Take 25 mg by mouth nightly  ProviderAgnieszka MD   Cholecalciferol (VITAMIN D) 2000 UNITS CAPS capsule Take 2,000 Units by mouth  ProviderAgnieszka MD   Calcium Carbonate-Vitamin D (CALCIUM-VITAMIN D) 500-200 MG-UNIT per tablet Take 1 tablet by mouth nightly  ProviderAgnieszka MD   therapeutic multivitamin-minerals (THERAGRAN-M) tablet Take 1 tablet by mouth daily  ProviderAgnieszka MD       CareTeam (Including outside providers/suppliers regularly involved in providing care):   Patient Care Team:  Matty Pedro MD as PCP - General  Matty Pedro MD as PCP - Empaneled Provider  Major Mir MD as Consulting Physician (Otolaryngology)     Reviewed and updated this visit:  Allergies  Meds  Problems

## 2024-04-12 ENCOUNTER — TELEPHONE (OUTPATIENT)
Dept: FAMILY MEDICINE CLINIC | Age: 69
End: 2024-04-12

## 2024-04-12 NOTE — TELEPHONE ENCOUNTER
Dr. Francesco Johnson [phone] called and stated that pt is going to have a dental procedure on 5/21. And was wanting to see if it was ok with you for her to hold the Eliquis  for 2 days before and 1 day after procedure. Also the Boniva hold for 2 months after the procedure. If you want you can send something to fax [phone].

## 2024-07-02 ENCOUNTER — HOSPITAL ENCOUNTER (OUTPATIENT)
Dept: MAMMOGRAPHY | Age: 69
Discharge: HOME | End: 2024-07-02
Payer: MEDICARE

## 2024-07-02 DIAGNOSIS — Z12.39 SCREENING BREAST EXAMINATION: ICD-10-CM

## 2024-07-02 PROCEDURE — 77063 BREAST TOMOSYNTHESIS BI: CPT

## 2024-07-10 ENCOUNTER — TELEPHONE (OUTPATIENT)
Dept: FAMILY MEDICINE CLINIC | Age: 69
End: 2024-07-10

## 2024-07-10 NOTE — TELEPHONE ENCOUNTER
Dr. Johnson  called and was wanting you to know that it is ok for pt to go back on her Boniva in 3 weeks from 7/1/24

## 2024-11-18 DIAGNOSIS — M19.90 ARTHRITIS: ICD-10-CM

## 2024-11-19 RX ORDER — CELECOXIB 200 MG/1
CAPSULE ORAL
Qty: 180 CAPSULE | Refills: 3 | Status: SHIPPED | OUTPATIENT
Start: 2024-11-19

## 2024-11-19 NOTE — TELEPHONE ENCOUNTER
Future appt scheduled 0 appt schedule                   Last appt 04/01/2024      Last Written 08/29/2023    celecoxib (CELEBREX) 200 MG capsule   #180  3 RF

## 2024-12-30 ENCOUNTER — NURSE ONLY (OUTPATIENT)
Dept: FAMILY MEDICINE CLINIC | Age: 69
End: 2024-12-30

## 2024-12-30 ENCOUNTER — TELEPHONE (OUTPATIENT)
Dept: FAMILY MEDICINE CLINIC | Age: 69
End: 2024-12-30

## 2024-12-30 DIAGNOSIS — R82.90 BAD ODOR OF URINE: Primary | ICD-10-CM

## 2024-12-30 DIAGNOSIS — R82.90 ABNORMAL URINE ODOR: Primary | ICD-10-CM

## 2024-12-30 LAB
BILIRUBIN, POC: ABNORMAL
BLOOD URINE, POC: ABNORMAL
CLARITY, POC: ABNORMAL
COLOR, POC: YELLOW
GLUCOSE URINE, POC: ABNORMAL MG/DL
KETONES, POC: ABNORMAL MG/DL
LEUKOCYTE EST, POC: ABNORMAL
NITRITE, POC: POSITIVE
PH, POC: 6
PROTEIN, POC: ABNORMAL MG/DL
SPECIFIC GRAVITY, POC: 1.01
UROBILINOGEN, POC: 0.2 MG/DL

## 2024-12-30 PROCEDURE — 81002 URINALYSIS NONAUTO W/O SCOPE: CPT | Performed by: NURSE PRACTITIONER

## 2024-12-30 RX ORDER — NITROFURANTOIN 25; 75 MG/1; MG/1
100 CAPSULE ORAL 2 TIMES DAILY
Qty: 14 CAPSULE | Refills: 0 | Status: SHIPPED | OUTPATIENT
Start: 2024-12-30 | End: 2025-01-06

## 2025-01-02 LAB
BACTERIA UR CULT: ABNORMAL
ORGANISM: ABNORMAL

## 2025-01-27 ENCOUNTER — LAB (OUTPATIENT)
Dept: FAMILY MEDICINE CLINIC | Age: 70
End: 2025-01-27
Payer: MEDICARE

## 2025-01-27 DIAGNOSIS — R82.90 ABNORMAL URINE ODOR: Primary | ICD-10-CM

## 2025-01-27 DIAGNOSIS — N39.0 URINARY TRACT INFECTION WITHOUT HEMATURIA, SITE UNSPECIFIED: Primary | ICD-10-CM

## 2025-01-27 LAB
BILIRUBIN, POC: ABNORMAL
BLOOD URINE, POC: ABNORMAL
CLARITY, POC: CLEAR
COLOR, POC: YELLOW
GLUCOSE URINE, POC: ABNORMAL MG/DL
KETONES, POC: ABNORMAL MG/DL
LEUKOCYTE EST, POC: ABNORMAL
NITRITE, POC: POSITIVE
PH, POC: 5.5
PROTEIN, POC: ABNORMAL MG/DL
SPECIFIC GRAVITY, POC: 1.02
UROBILINOGEN, POC: 0.2 MG/DL

## 2025-01-27 PROCEDURE — 81002 URINALYSIS NONAUTO W/O SCOPE: CPT

## 2025-01-31 DIAGNOSIS — N30.00 ACUTE CYSTITIS WITHOUT HEMATURIA: Primary | ICD-10-CM

## 2025-01-31 LAB
BACTERIA UR CULT: ABNORMAL
ORGANISM: ABNORMAL

## 2025-01-31 RX ORDER — CEFDINIR 300 MG/1
300 CAPSULE ORAL 2 TIMES DAILY
Qty: 10 CAPSULE | Refills: 0 | Status: SHIPPED | OUTPATIENT
Start: 2025-01-31 | End: 2025-02-05

## 2025-02-07 DIAGNOSIS — M62.838 MUSCLE SPASM: ICD-10-CM

## 2025-02-07 RX ORDER — CYCLOBENZAPRINE HCL 5 MG
TABLET ORAL
Qty: 60 TABLET | Refills: 1 | Status: SHIPPED | OUTPATIENT
Start: 2025-02-07

## 2025-02-07 NOTE — TELEPHONE ENCOUNTER
Refill Request     CONFIRM preferrred pharmacy with the patient.    If Mail Order Rx - Pend for 90 day refill.      Last Seen: Last Seen Department: 4/1/2024  Last Seen by PCP: Visit date not found    Last Written: 6.1.23    If no future appointment scheduled, route STAFF MESSAGE with patient name to the  Pool for scheduling.      Next Appointment:   Future Appointments   Date Time Provider Department Center   3/7/2025 10:10 AM [facility]       Message sent to  to schedule appt with patient?  N/A      Requested Prescriptions     Pending Prescriptions Disp Refills    cyclobenzaprine (FLEXERIL) 5 MG tablet [Pharmacy Med Name: CYCLOBENZAPRINE HYDROCHLORIDE 5MG TABLET] 60 tablet 1     Sig: TAKE ONE (1) TO TWO (2) TABLETS BY MOUTH NIGHTLY AS NEEDED FOR MUSCLE SPASMS

## 2025-02-12 ENCOUNTER — PATIENT MESSAGE (OUTPATIENT)
Dept: FAMILY MEDICINE CLINIC | Age: 70
End: 2025-02-12

## 2025-02-12 DIAGNOSIS — Z00.00 ANNUAL PHYSICAL EXAM: ICD-10-CM

## 2025-02-12 DIAGNOSIS — R73.9 HYPERGLYCEMIA: ICD-10-CM

## 2025-02-12 DIAGNOSIS — M85.80 OSTEOPENIA, UNSPECIFIED LOCATION: Primary | ICD-10-CM

## 2025-02-12 DIAGNOSIS — Z13.29 SCREENING FOR THYROID DISORDER: ICD-10-CM

## 2025-02-12 DIAGNOSIS — Z13.220 SCREENING FOR LIPID DISORDERS: ICD-10-CM

## 2025-02-12 DIAGNOSIS — Z13.21 ENCOUNTER FOR VITAMIN DEFICIENCY SCREENING: ICD-10-CM

## 2025-03-04 ASSESSMENT — PATIENT HEALTH QUESTIONNAIRE - PHQ9
2. FEELING DOWN, DEPRESSED OR HOPELESS: NOT AT ALL
SUM OF ALL RESPONSES TO PHQ QUESTIONS 1-9: 0
2. FEELING DOWN, DEPRESSED OR HOPELESS: NOT AT ALL
SUM OF ALL RESPONSES TO PHQ QUESTIONS 1-9: 0
1. LITTLE INTEREST OR PLEASURE IN DOING THINGS: NOT AT ALL
SUM OF ALL RESPONSES TO PHQ QUESTIONS 1-9: 0
SUM OF ALL RESPONSES TO PHQ QUESTIONS 1-9: 0
1. LITTLE INTEREST OR PLEASURE IN DOING THINGS: NOT AT ALL
SUM OF ALL RESPONSES TO PHQ9 QUESTIONS 1 & 2: 0

## 2025-03-05 ENCOUNTER — LAB (OUTPATIENT)
Dept: FAMILY MEDICINE CLINIC | Age: 70
End: 2025-03-05
Payer: MEDICARE

## 2025-03-05 DIAGNOSIS — M85.80 OSTEOPENIA, UNSPECIFIED LOCATION: ICD-10-CM

## 2025-03-05 DIAGNOSIS — Z13.29 SCREENING FOR THYROID DISORDER: ICD-10-CM

## 2025-03-05 DIAGNOSIS — R73.9 HYPERGLYCEMIA: ICD-10-CM

## 2025-03-05 DIAGNOSIS — Z13.21 ENCOUNTER FOR VITAMIN DEFICIENCY SCREENING: ICD-10-CM

## 2025-03-05 DIAGNOSIS — Z00.00 ANNUAL PHYSICAL EXAM: ICD-10-CM

## 2025-03-05 DIAGNOSIS — Z13.220 SCREENING FOR LIPID DISORDERS: ICD-10-CM

## 2025-03-05 LAB
25(OH)D3 SERPL-MCNC: 49.1 NG/ML
ALBUMIN SERPL-MCNC: 4.6 G/DL (ref 3.4–5)
ALBUMIN/GLOB SERPL: 1.8 {RATIO} (ref 1.1–2.2)
ALP SERPL-CCNC: 56 U/L (ref 40–129)
ALT SERPL-CCNC: 16 U/L (ref 10–40)
ANION GAP SERPL CALCULATED.3IONS-SCNC: 9 MMOL/L (ref 3–16)
AST SERPL-CCNC: 23 U/L (ref 15–37)
BASOPHILS # BLD: 0 K/UL (ref 0–0.2)
BASOPHILS NFR BLD: 0.7 %
BILIRUB SERPL-MCNC: 0.3 MG/DL (ref 0–1)
BUN SERPL-MCNC: 21 MG/DL (ref 7–20)
CALCIUM SERPL-MCNC: 10 MG/DL (ref 8.3–10.6)
CHLORIDE SERPL-SCNC: 101 MMOL/L (ref 99–110)
CHOLEST SERPL-MCNC: 191 MG/DL (ref 0–199)
CO2 SERPL-SCNC: 28 MMOL/L (ref 21–32)
CREAT SERPL-MCNC: 0.7 MG/DL (ref 0.6–1.2)
DEPRECATED RDW RBC AUTO: 13.3 % (ref 12.4–15.4)
EOSINOPHIL # BLD: 0.1 K/UL (ref 0–0.6)
EOSINOPHIL NFR BLD: 2.9 %
FOLATE SERPL-MCNC: >40 NG/ML (ref 4.78–24.2)
GFR SERPLBLD CREATININE-BSD FMLA CKD-EPI: >90 ML/MIN/{1.73_M2}
GLUCOSE SERPL-MCNC: 89 MG/DL (ref 70–99)
HCT VFR BLD AUTO: 40.5 % (ref 36–48)
HDLC SERPL-MCNC: 74 MG/DL (ref 40–60)
HGB BLD-MCNC: 13.5 G/DL (ref 12–16)
LDLC SERPL CALC-MCNC: 100 MG/DL
LYMPHOCYTES # BLD: 1.3 K/UL (ref 1–5.1)
LYMPHOCYTES NFR BLD: 33.5 %
MCH RBC QN AUTO: 29 PG (ref 26–34)
MCHC RBC AUTO-ENTMCNC: 33.2 G/DL (ref 31–36)
MCV RBC AUTO: 87.3 FL (ref 80–100)
MONOCYTES # BLD: 0.3 K/UL (ref 0–1.3)
MONOCYTES NFR BLD: 8.3 %
NEUTROPHILS # BLD: 2 K/UL (ref 1.7–7.7)
NEUTROPHILS NFR BLD: 54.6 %
PLATELET # BLD AUTO: 234 K/UL (ref 135–450)
PMV BLD AUTO: 9.3 FL (ref 5–10.5)
POTASSIUM SERPL-SCNC: 4.9 MMOL/L (ref 3.5–5.1)
PROT SERPL-MCNC: 7.1 G/DL (ref 6.4–8.2)
RBC # BLD AUTO: 4.64 M/UL (ref 4–5.2)
SODIUM SERPL-SCNC: 138 MMOL/L (ref 136–145)
TRIGL SERPL-MCNC: 83 MG/DL (ref 0–150)
TSH SERPL DL<=0.005 MIU/L-ACNC: 1.87 UIU/ML (ref 0.27–4.2)
VIT B12 SERPL-MCNC: 751 PG/ML (ref 211–911)
VLDLC SERPL CALC-MCNC: 17 MG/DL
WBC # BLD AUTO: 3.7 K/UL (ref 4–11)

## 2025-03-05 PROCEDURE — 36415 COLL VENOUS BLD VENIPUNCTURE: CPT

## 2025-03-06 LAB
EST. AVERAGE GLUCOSE BLD GHB EST-MCNC: 108.3 MG/DL
HBA1C MFR BLD: 5.4 %

## 2025-03-07 ENCOUNTER — OFFICE VISIT (OUTPATIENT)
Dept: FAMILY MEDICINE CLINIC | Age: 70
End: 2025-03-07

## 2025-03-07 VITALS
HEART RATE: 69 BPM | BODY MASS INDEX: 22.42 KG/M2 | OXYGEN SATURATION: 93 % | DIASTOLIC BLOOD PRESSURE: 100 MMHG | SYSTOLIC BLOOD PRESSURE: 170 MMHG | RESPIRATION RATE: 17 BRPM | WEIGHT: 141 LBS

## 2025-03-07 DIAGNOSIS — M85.80 OSTEOPENIA, UNSPECIFIED LOCATION: ICD-10-CM

## 2025-03-07 DIAGNOSIS — Z51.81 ENCOUNTER FOR MONITORING CHRONIC NSAID THERAPY: ICD-10-CM

## 2025-03-07 DIAGNOSIS — R03.0 ELEVATED BLOOD PRESSURE READING IN OFFICE WITHOUT DIAGNOSIS OF HYPERTENSION: Primary | ICD-10-CM

## 2025-03-07 DIAGNOSIS — Z79.1 ENCOUNTER FOR MONITORING CHRONIC NSAID THERAPY: ICD-10-CM

## 2025-03-07 DIAGNOSIS — Z86.711 HISTORY OF PULMONARY EMBOLISM: ICD-10-CM

## 2025-03-07 DIAGNOSIS — M62.838 MUSCLE SPASM: ICD-10-CM

## 2025-03-07 DIAGNOSIS — Z86.718 HISTORY OF DEEP VEIN THROMBOSIS (DVT) OF LOWER EXTREMITY: ICD-10-CM

## 2025-03-07 RX ORDER — AMLODIPINE BESYLATE 2.5 MG/1
2.5 TABLET ORAL DAILY
Qty: 90 TABLET | Refills: 1 | Status: SHIPPED | OUTPATIENT
Start: 2025-03-07

## 2025-03-07 RX ORDER — FAMCICLOVIR 500 MG/1
TABLET ORAL
Qty: 20 TABLET | Refills: 0 | Status: SHIPPED | OUTPATIENT
Start: 2025-03-07

## 2025-03-07 RX ORDER — IBANDRONATE SODIUM 150 MG/1
TABLET, FILM COATED ORAL
Qty: 3 TABLET | Refills: 3 | Status: SHIPPED | OUTPATIENT
Start: 2025-03-07

## 2025-03-07 RX ORDER — ROSUVASTATIN CALCIUM 5 MG/1
5 TABLET, COATED ORAL DAILY
Qty: 90 TABLET | Refills: 3 | Status: SHIPPED | OUTPATIENT
Start: 2025-03-07

## 2025-03-07 RX ORDER — CYCLOBENZAPRINE HCL 5 MG
TABLET ORAL
Qty: 60 TABLET | Refills: 1 | Status: SHIPPED | OUTPATIENT
Start: 2025-03-07

## 2025-03-07 SDOH — ECONOMIC STABILITY: FOOD INSECURITY: WITHIN THE PAST 12 MONTHS, THE FOOD YOU BOUGHT JUST DIDN'T LAST AND YOU DIDN'T HAVE MONEY TO GET MORE.: NEVER TRUE

## 2025-03-07 SDOH — ECONOMIC STABILITY: FOOD INSECURITY: WITHIN THE PAST 12 MONTHS, YOU WORRIED THAT YOUR FOOD WOULD RUN OUT BEFORE YOU GOT MONEY TO BUY MORE.: NEVER TRUE

## 2025-03-07 ASSESSMENT — ENCOUNTER SYMPTOMS
ABDOMINAL PAIN: 0
SHORTNESS OF BREATH: 0
NAUSEA: 0
BACK PAIN: 1
DIARRHEA: 0
CONSTIPATION: 0

## 2025-03-07 NOTE — ASSESSMENT & PLAN NOTE
Chronic, at goal (stable), continue Ibandronate as well as calcium and vitamin D supplementation.  DEXA scan from 6/30/2023 showing osteopenia by WHO criteria.    Orders:    ibandronate (BONIVA) 150 MG tablet; TAKE ONE (1) TABLET ONCE PER MONTH IN THE MORNING WITH A FULL GLASS OF WATER, ON AN EMPTY STOMACH, AND DO NOT TAKE ANYTHING ELSE BY MOUTH OR LIE DOWN FOR THE NEXT HOUR

## 2025-03-07 NOTE — ASSESSMENT & PLAN NOTE
New, not at goal (unstable), patient may possibly have underlying whitecoat syndrome.  Blood pressure readings at home mostly at goal.  Will trial small dose of amlodipine 2.5 mg daily.  Advised patient to monitor her blood pressures at home, and if she finds that her blood pressures are consistently in the low 100s, then I would recommend stopping amlodipine, to prevent hypotension/dizziness or falls considering her history of osteopenia and being on a blood thinner.  Additionally, I did review her ASCVD risk score, however suspect it may be higher than her actual risk score considering her elevated blood pressure in office.  Will hold off from making adjustments to her statin dosing, as patient is generally physically active and leads a healthy lifestyle.    Orders:    amLODIPine (NORVASC) 2.5 MG tablet; Take 1 tablet by mouth daily

## 2025-03-07 NOTE — PATIENT INSTRUCTIONS
Monitor your blood pressure at home.  I have started you on a low-dose of amlodipine 2.5 mg to take daily, however if you find that your blood pressures are consistently less than 110-120 systolic/top number, I would consider discontinuing amlodipine, to prevent hypotension/lightheadedness or dizziness.

## 2025-03-07 NOTE — ASSESSMENT & PLAN NOTE
Patient reports being on daily Celebrex for at least 20 years.  I did discuss with patient the risks associated with being on NSAIDs, discussed the possibility of starting patient on PPI, however will hold off for the time being.

## 2025-03-07 NOTE — PROGRESS NOTES
[facility]  3/7/2025    Shayla Caal (:  1955) is a 69 y.o. female, here for evaluation of the following medical concerns:    Chief Complaint   Patient presents with    New Patient    Hypertension          HPI    Past medical history of osteopenia on ibandronate, history of PE on Eliquis, osteoarthritis, vaginal atrophy, right ventricular enlargement, hyperlipidemia on rosuvastatin.    2 recent UTI's which grew E coli.   Started 6-8 months ago, urine smells but other than that, no symptoms.     Labs reviewed with patient remarkable for elevated folate level greater than 40, in mildly low white blood cell count consistent with prior labs  Takes vitamin d and calcium supplementation, and multivitamin which contains folate.     Flexeril as needed for back spasms after exertion such as gardening.   Celebrex daily, arthritis of back and kenes. She takes NSAIDs chronically, daily for possibly 2 decades.     She goes to the [place]. 14 grand children.     Patient does check her blood pressure at home daily with most blood pressure readings ranging from 110s-130s systolic over 60s to 70s diastolic.       ROS  Review of Systems   Eyes:  Negative for visual disturbance.   Respiratory:  Negative for shortness of breath.    Cardiovascular:  Negative for chest pain and leg swelling.   Gastrointestinal:  Negative for abdominal pain, constipation, diarrhea and nausea.   Genitourinary:  Negative for decreased urine volume, difficulty urinating, dysuria, menstrual problem and vaginal bleeding.   Musculoskeletal:  Positive for arthralgias and back pain.   Neurological:  Negative for dizziness, light-headedness and headaches.       HISTORIES  Current Outpatient Medications on File Prior to Visit   Medication Sig Dispense Refill    celecoxib (CELEBREX) 200 MG capsule TAKE ONE CAPSULE BY MOUTH TWO TIMES DAILY AS NEEDED 180 capsule 3    apixaban (ELIQUIS) 5 MG TABS tablet Take 1 tablet by mouth 2

## 2025-03-10 ENCOUNTER — LAB (OUTPATIENT)
Dept: FAMILY MEDICINE CLINIC | Age: 70
End: 2025-03-10
Payer: MEDICARE

## 2025-03-10 ENCOUNTER — RESULTS FOLLOW-UP (OUTPATIENT)
Dept: FAMILY MEDICINE CLINIC | Age: 70
End: 2025-03-10

## 2025-03-10 DIAGNOSIS — N39.0 RECURRENT UTI: Primary | ICD-10-CM

## 2025-03-10 DIAGNOSIS — R82.90 FOUL SMELLING URINE: Primary | ICD-10-CM

## 2025-03-10 LAB
BILIRUBIN, POC: ABNORMAL
BLOOD URINE, POC: ABNORMAL
CLARITY, POC: CLEAR
COLOR, POC: YELLOW
GLUCOSE URINE, POC: ABNORMAL MG/DL
KETONES, POC: ABNORMAL MG/DL
LEUKOCYTE EST, POC: ABNORMAL
NITRITE, POC: POSITIVE
PH, POC: 5.5
PROTEIN, POC: ABNORMAL MG/DL
SPECIFIC GRAVITY, POC: 1.01
UROBILINOGEN, POC: 0.2 MG/DL

## 2025-03-10 PROCEDURE — 81002 URINALYSIS NONAUTO W/O SCOPE: CPT

## 2025-03-14 DIAGNOSIS — N39.0 RECURRENT UTI: Primary | ICD-10-CM

## 2025-03-14 LAB
BACTERIA UR CULT: ABNORMAL
ORGANISM: ABNORMAL

## 2025-03-14 RX ORDER — NITROFURANTOIN 25; 75 MG/1; MG/1
100 CAPSULE ORAL 2 TIMES DAILY
Qty: 10 CAPSULE | Refills: 0 | Status: SHIPPED | OUTPATIENT
Start: 2025-03-14 | End: 2025-03-19

## 2025-06-25 NOTE — TELEPHONE ENCOUNTER
Refill Request     CONFIRM preferrred pharmacy with the patient.    If Mail Order Rx - Pend for 90 day refill.      Last Seen: Last Seen Department: 3/7/2025  Last Seen by PCP: 3/7/2025    Last Written: 8/14/24    If no future appointment scheduled, route STAFF MESSAGE with patient name to the  Pool for scheduling.      Next Appointment:   Future Appointments   Date Time Provider Department Center   7/7/2025 11:00 [place] [facility] [facility] [place] [facility]   7/7/2025 12:00 [place] [facility] [place] [place] [facility]   3/6/2026  8:10 AM Milady Dickey MD [facility] DE[facility]       Message sent to  to schedule appt with patient?  N/A      Requested Prescriptions     Pending Prescriptions Disp Refills    apixaban (ELIQUIS) 5 MG TABS tablet 180 tablet 0     Sig: Take 1 tablet by mouth 2 times daily

## 2025-07-07 ENCOUNTER — HOSPITAL ENCOUNTER (OUTPATIENT)
Dept: MAMMOGRAPHY | Age: 70
Discharge: HOME | End: 2025-07-07
Payer: MEDICARE

## 2025-07-07 ENCOUNTER — HOSPITAL ENCOUNTER (OUTPATIENT)
Dept: GENERAL RADIOLOGY | Age: 70
Discharge: HOME | End: 2025-07-07
Attending: OBSTETRICS & GYNECOLOGY
Payer: MEDICARE

## 2025-07-07 DIAGNOSIS — Z87.81 HISTORY OF FRACTURE: ICD-10-CM

## 2025-07-07 DIAGNOSIS — M85.80 OSTEOPENIA, UNSPECIFIED LOCATION: ICD-10-CM

## 2025-07-07 DIAGNOSIS — Z12.39 SCREENING BREAST EXAMINATION: ICD-10-CM

## 2025-07-07 DIAGNOSIS — M85.832 OTHER SPECIFIED DISORDERS OF BONE DENSITY AND STRUCTURE, LEFT FOREARM: ICD-10-CM

## 2025-07-07 PROCEDURE — 77063 BREAST TOMOSYNTHESIS BI: CPT

## 2025-07-07 PROCEDURE — 77080 DXA BONE DENSITY AXIAL: CPT

## 2025-08-28 DIAGNOSIS — R03.0 ELEVATED BLOOD PRESSURE READING IN OFFICE WITHOUT DIAGNOSIS OF HYPERTENSION: ICD-10-CM

## 2025-08-29 RX ORDER — AMLODIPINE BESYLATE 2.5 MG/1
2.5 TABLET ORAL DAILY
Qty: 90 TABLET | Refills: 0 | Status: SHIPPED | OUTPATIENT
Start: 2025-08-29